# Patient Record
Sex: FEMALE | Race: WHITE
[De-identification: names, ages, dates, MRNs, and addresses within clinical notes are randomized per-mention and may not be internally consistent; named-entity substitution may affect disease eponyms.]

---

## 2017-11-15 ENCOUNTER — APPOINTMENT (OUTPATIENT)
Dept: BARIATRICS | Facility: CLINIC | Age: 62
End: 2017-11-15

## 2018-04-11 ENCOUNTER — OUTPATIENT (OUTPATIENT)
Dept: OUTPATIENT SERVICES | Facility: HOSPITAL | Age: 63
LOS: 1 days | Discharge: HOME | End: 2018-04-11
Payer: MEDICARE

## 2018-04-11 VITALS
OXYGEN SATURATION: 99 % | DIASTOLIC BLOOD PRESSURE: 88 MMHG | RESPIRATION RATE: 14 BRPM | HEART RATE: 50 BPM | HEIGHT: 64 IN | TEMPERATURE: 98 F | SYSTOLIC BLOOD PRESSURE: 140 MMHG | WEIGHT: 257.06 LBS

## 2018-04-11 DIAGNOSIS — Z01.818 ENCOUNTER FOR OTHER PREPROCEDURAL EXAMINATION: ICD-10-CM

## 2018-04-11 DIAGNOSIS — Z98.89 OTHER SPECIFIED POSTPROCEDURAL STATES: Chronic | ICD-10-CM

## 2018-04-11 DIAGNOSIS — Z95.1 PRESENCE OF AORTOCORONARY BYPASS GRAFT: Chronic | ICD-10-CM

## 2018-04-11 DIAGNOSIS — D62 ACUTE POSTHEMORRHAGIC ANEMIA: ICD-10-CM

## 2018-04-11 DIAGNOSIS — J98.4 OTHER DISORDERS OF LUNG: ICD-10-CM

## 2018-04-11 DIAGNOSIS — M17.11 UNILATERAL PRIMARY OSTEOARTHRITIS, RIGHT KNEE: ICD-10-CM

## 2018-04-11 DIAGNOSIS — E66.01 MORBID (SEVERE) OBESITY DUE TO EXCESS CALORIES: ICD-10-CM

## 2018-04-11 DIAGNOSIS — I10 ESSENTIAL (PRIMARY) HYPERTENSION: ICD-10-CM

## 2018-04-11 DIAGNOSIS — Z98.890 OTHER SPECIFIED POSTPROCEDURAL STATES: Chronic | ICD-10-CM

## 2018-04-11 DIAGNOSIS — Z98.84 BARIATRIC SURGERY STATUS: Chronic | ICD-10-CM

## 2018-04-11 DIAGNOSIS — Z96.612 PRESENCE OF LEFT ARTIFICIAL SHOULDER JOINT: Chronic | ICD-10-CM

## 2018-04-11 DIAGNOSIS — I25.2 OLD MYOCARDIAL INFARCTION: ICD-10-CM

## 2018-04-11 DIAGNOSIS — Z90.49 ACQUIRED ABSENCE OF OTHER SPECIFIED PARTS OF DIGESTIVE TRACT: Chronic | ICD-10-CM

## 2018-04-11 LAB
ANION GAP SERPL CALC-SCNC: 5 MMOL/L — SIGNIFICANT CHANGE UP (ref 5–17)
BASOPHILS # BLD AUTO: 0.05 K/UL — SIGNIFICANT CHANGE UP (ref 0–0.2)
BASOPHILS NFR BLD AUTO: 0.4 % — SIGNIFICANT CHANGE UP (ref 0–2)
BUN SERPL-MCNC: 27 MG/DL — HIGH (ref 7–23)
CALCIUM SERPL-MCNC: 9.3 MG/DL — SIGNIFICANT CHANGE UP (ref 8.5–10.1)
CHLORIDE SERPL-SCNC: 105 MMOL/L — SIGNIFICANT CHANGE UP (ref 96–108)
CO2 SERPL-SCNC: 30 MMOL/L — SIGNIFICANT CHANGE UP (ref 22–31)
CREAT SERPL-MCNC: 1.28 MG/DL — SIGNIFICANT CHANGE UP (ref 0.5–1.3)
EOSINOPHIL # BLD AUTO: 0.04 K/UL — SIGNIFICANT CHANGE UP (ref 0–0.5)
EOSINOPHIL NFR BLD AUTO: 0.3 % — SIGNIFICANT CHANGE UP (ref 0–6)
GLUCOSE SERPL-MCNC: 103 MG/DL — HIGH (ref 70–99)
HCT VFR BLD CALC: 43.8 % — SIGNIFICANT CHANGE UP (ref 34.5–45)
HGB BLD-MCNC: 13.8 G/DL — SIGNIFICANT CHANGE UP (ref 11.5–15.5)
IMM GRANULOCYTES NFR BLD AUTO: 0.5 % — SIGNIFICANT CHANGE UP (ref 0–1.5)
LYMPHOCYTES # BLD AUTO: 2.69 K/UL — SIGNIFICANT CHANGE UP (ref 1–3.3)
LYMPHOCYTES # BLD AUTO: 20.8 % — SIGNIFICANT CHANGE UP (ref 13–44)
MCHC RBC-ENTMCNC: 30.9 PG — SIGNIFICANT CHANGE UP (ref 27–34)
MCHC RBC-ENTMCNC: 31.5 GM/DL — LOW (ref 32–36)
MCV RBC AUTO: 98.2 FL — SIGNIFICANT CHANGE UP (ref 80–100)
MONOCYTES # BLD AUTO: 0.74 K/UL — SIGNIFICANT CHANGE UP (ref 0–0.9)
MONOCYTES NFR BLD AUTO: 5.7 % — SIGNIFICANT CHANGE UP (ref 2–14)
NEUTROPHILS # BLD AUTO: 9.35 K/UL — HIGH (ref 1.8–7.4)
NEUTROPHILS NFR BLD AUTO: 72.3 % — SIGNIFICANT CHANGE UP (ref 43–77)
PLATELET # BLD AUTO: 274 K/UL — SIGNIFICANT CHANGE UP (ref 150–400)
POTASSIUM SERPL-MCNC: 4.9 MMOL/L — SIGNIFICANT CHANGE UP (ref 3.5–5.3)
POTASSIUM SERPL-SCNC: 4.9 MMOL/L — SIGNIFICANT CHANGE UP (ref 3.5–5.3)
RBC # BLD: 4.46 M/UL — SIGNIFICANT CHANGE UP (ref 3.8–5.2)
RBC # FLD: 13 % — SIGNIFICANT CHANGE UP (ref 10.3–14.5)
SODIUM SERPL-SCNC: 140 MMOL/L — SIGNIFICANT CHANGE UP (ref 135–145)
TYPE + AB SCN PNL BLD: SIGNIFICANT CHANGE UP
WBC # BLD: 12.94 K/UL — HIGH (ref 3.8–10.5)
WBC # FLD AUTO: 12.94 K/UL — HIGH (ref 3.8–10.5)

## 2018-04-11 PROCEDURE — 71046 X-RAY EXAM CHEST 2 VIEWS: CPT | Mod: 26

## 2018-04-11 PROCEDURE — 93010 ELECTROCARDIOGRAM REPORT: CPT

## 2018-04-11 NOTE — H&P PST ADULT - HISTORY OF PRESENT ILLNESS
61 yo female presents to PST. c/o right knee pain x 4 years. States pain increases with walking & stairs. Reports "it locks up and I fall". reports knee pain 7/10 & is Followed by pain management for spinal stenosis.

## 2018-04-11 NOTE — H&P PST ADULT - FAMILY HISTORY
Father  Still living? No  Family history of heart disease, Age at diagnosis: Age Unknown     Mother  Still living? No  Family history of heart disease, Age at diagnosis: Age Unknown  Family history of stroke, Age at diagnosis: Age Unknown

## 2018-04-11 NOTE — H&P PST ADULT - PSH
History of esophagogastroduodenoscopy (EGD)    S/P   2980, 1982  S/P CABG x 2  2009  S/P cholecystectomy  1983  S/P colonoscopy    S/P laparoscopic sleeve gastrectomy  2016  S/P shoulder replacement, left  2010  Status post lung surgery  age 6 left upper lobectomy

## 2018-04-11 NOTE — H&P PST ADULT - MUSCULOSKELETAL COMMENTS
Reports right knee pain & limited ROM. Followed by pain management for h/o spinal stenosis. right anterior knee joints  to palpation. Right knee decreased extension

## 2018-04-11 NOTE — H&P PST ADULT - ASSESSMENT
yo scheduled for   with    on     1. Labs as per surgeon  2. EKG  3. Medical clearance with  4. discussed EZ sponges & day of procedure instructions 63 yo female scheduled for right TKR on 4/19/18 with Dr. Arredondo    1. Labs as per surgeon  2. EKG  3. Medical clearance with PCP Dr BRODY Ingram  4. discussed EZ sponges, mupirocin & day of procedure instructions  5. CXR  6. Stat PTT, PT/INR on admit  Caprini score 10

## 2018-04-11 NOTE — H&P PST ADULT - PMH
Asthma    Back pain    Hyperlipidemia    Hypertension    Insomnia    Knee pain, right    Morbid obesity    Myocardial infarction  2009  Osteoarthritis    Pain management    Sleep apnea  resolved with gastric sleeve surgery  Spinal stenosis Asthma    Back pain    Hyperlipidemia    Hypertension    Insomnia    Knee pain, right    Morbid obesity    Myocardial infarction  2009  Osteoarthritis    Pain management    Right bundle branch block    Sleep apnea  resolved with gastric sleeve surgery  Spinal stenosis

## 2018-04-12 LAB
MRSA PCR RESULT.: SIGNIFICANT CHANGE UP
S AUREUS DNA NOSE QL NAA+PROBE: SIGNIFICANT CHANGE UP

## 2018-04-13 RX ORDER — PANTOPRAZOLE SODIUM 20 MG/1
40 TABLET, DELAYED RELEASE ORAL ONCE
Qty: 0 | Refills: 0 | Status: COMPLETED | OUTPATIENT
Start: 2018-04-19 | End: 2018-04-19

## 2018-04-18 RX ORDER — CELECOXIB 200 MG/1
200 CAPSULE ORAL
Qty: 0 | Refills: 0 | Status: DISCONTINUED | OUTPATIENT
Start: 2018-04-19 | End: 2018-04-21

## 2018-04-18 RX ORDER — OXYCODONE HYDROCHLORIDE 5 MG/1
20 TABLET ORAL ONCE
Qty: 0 | Refills: 0 | Status: DISCONTINUED | OUTPATIENT
Start: 2018-04-19 | End: 2018-04-19

## 2018-04-18 RX ORDER — HYDROMORPHONE HYDROCHLORIDE 2 MG/ML
0.5 INJECTION INTRAMUSCULAR; INTRAVENOUS; SUBCUTANEOUS
Qty: 0 | Refills: 0 | Status: DISCONTINUED | OUTPATIENT
Start: 2018-04-19 | End: 2018-04-20

## 2018-04-18 RX ORDER — OXYCODONE HYDROCHLORIDE 5 MG/1
10 TABLET ORAL EVERY 12 HOURS
Qty: 0 | Refills: 0 | Status: DISCONTINUED | OUTPATIENT
Start: 2018-04-19 | End: 2018-04-21

## 2018-04-18 RX ORDER — ACETAMINOPHEN 500 MG
650 TABLET ORAL EVERY 6 HOURS
Qty: 0 | Refills: 0 | Status: DISCONTINUED | OUTPATIENT
Start: 2018-04-19 | End: 2018-04-21

## 2018-04-18 RX ORDER — SODIUM CHLORIDE 9 MG/ML
3 INJECTION INTRAMUSCULAR; INTRAVENOUS; SUBCUTANEOUS EVERY 8 HOURS
Qty: 0 | Refills: 0 | Status: DISCONTINUED | OUTPATIENT
Start: 2018-04-19 | End: 2018-04-21

## 2018-04-18 RX ORDER — DOCUSATE SODIUM 100 MG
100 CAPSULE ORAL EVERY 12 HOURS
Qty: 0 | Refills: 0 | Status: DISCONTINUED | OUTPATIENT
Start: 2018-04-19 | End: 2018-04-21

## 2018-04-18 RX ORDER — ACETAMINOPHEN 500 MG
650 TABLET ORAL ONCE
Qty: 0 | Refills: 0 | Status: COMPLETED | OUTPATIENT
Start: 2018-04-19 | End: 2018-04-19

## 2018-04-18 RX ORDER — OXYCODONE HYDROCHLORIDE 5 MG/1
5 TABLET ORAL EVERY 4 HOURS
Qty: 0 | Refills: 0 | Status: DISCONTINUED | OUTPATIENT
Start: 2018-04-19 | End: 2018-04-20

## 2018-04-18 RX ORDER — OXYCODONE HYDROCHLORIDE 5 MG/1
10 TABLET ORAL EVERY 4 HOURS
Qty: 0 | Refills: 0 | Status: DISCONTINUED | OUTPATIENT
Start: 2018-04-19 | End: 2018-04-20

## 2018-04-18 RX ORDER — ONDANSETRON 8 MG/1
4 TABLET, FILM COATED ORAL EVERY 6 HOURS
Qty: 0 | Refills: 0 | Status: DISCONTINUED | OUTPATIENT
Start: 2018-04-19 | End: 2018-04-21

## 2018-04-18 RX ORDER — FAMOTIDINE 10 MG/ML
20 INJECTION INTRAVENOUS ONCE
Qty: 0 | Refills: 0 | Status: COMPLETED | OUTPATIENT
Start: 2018-04-19 | End: 2018-04-19

## 2018-04-19 ENCOUNTER — RESULT REVIEW (OUTPATIENT)
Age: 63
End: 2018-04-19

## 2018-04-19 ENCOUNTER — INPATIENT (INPATIENT)
Facility: HOSPITAL | Age: 63
LOS: 1 days | Discharge: NURSING, REHAB OR LONG-TERM CARE | End: 2018-04-21
Attending: ORTHOPAEDIC SURGERY | Admitting: ORTHOPAEDIC SURGERY
Payer: COMMERCIAL

## 2018-04-19 VITALS
TEMPERATURE: 98 F | OXYGEN SATURATION: 100 % | DIASTOLIC BLOOD PRESSURE: 83 MMHG | WEIGHT: 257.06 LBS | SYSTOLIC BLOOD PRESSURE: 151 MMHG | HEIGHT: 64 IN | RESPIRATION RATE: 16 BRPM | HEART RATE: 49 BPM

## 2018-04-19 DIAGNOSIS — Z98.890 OTHER SPECIFIED POSTPROCEDURAL STATES: Chronic | ICD-10-CM

## 2018-04-19 DIAGNOSIS — Z96.612 PRESENCE OF LEFT ARTIFICIAL SHOULDER JOINT: Chronic | ICD-10-CM

## 2018-04-19 DIAGNOSIS — Z95.1 PRESENCE OF AORTOCORONARY BYPASS GRAFT: Chronic | ICD-10-CM

## 2018-04-19 DIAGNOSIS — Z98.89 OTHER SPECIFIED POSTPROCEDURAL STATES: Chronic | ICD-10-CM

## 2018-04-19 DIAGNOSIS — Z90.49 ACQUIRED ABSENCE OF OTHER SPECIFIED PARTS OF DIGESTIVE TRACT: Chronic | ICD-10-CM

## 2018-04-19 DIAGNOSIS — Z98.84 BARIATRIC SURGERY STATUS: Chronic | ICD-10-CM

## 2018-04-19 LAB
ANION GAP SERPL CALC-SCNC: 5 MMOL/L — SIGNIFICANT CHANGE UP (ref 5–17)
APTT BLD: 27 SEC — LOW (ref 27.5–37.4)
BUN SERPL-MCNC: 11 MG/DL — SIGNIFICANT CHANGE UP (ref 7–23)
CALCIUM SERPL-MCNC: 8.8 MG/DL — SIGNIFICANT CHANGE UP (ref 8.5–10.1)
CHLORIDE SERPL-SCNC: 109 MMOL/L — HIGH (ref 96–108)
CO2 SERPL-SCNC: 29 MMOL/L — SIGNIFICANT CHANGE UP (ref 22–31)
CREAT SERPL-MCNC: 1.19 MG/DL — SIGNIFICANT CHANGE UP (ref 0.5–1.3)
GLUCOSE SERPL-MCNC: 88 MG/DL — SIGNIFICANT CHANGE UP (ref 70–99)
HCT VFR BLD CALC: 36.2 % — SIGNIFICANT CHANGE UP (ref 34.5–45)
HGB BLD-MCNC: 11.4 G/DL — LOW (ref 11.5–15.5)
INR BLD: 0.98 RATIO — SIGNIFICANT CHANGE UP (ref 0.88–1.16)
MCHC RBC-ENTMCNC: 31.3 PG — SIGNIFICANT CHANGE UP (ref 27–34)
MCHC RBC-ENTMCNC: 31.5 GM/DL — LOW (ref 32–36)
MCV RBC AUTO: 99.5 FL — SIGNIFICANT CHANGE UP (ref 80–100)
NRBC # BLD: 0 /100 WBCS — SIGNIFICANT CHANGE UP (ref 0–0)
PLATELET # BLD AUTO: 177 K/UL — SIGNIFICANT CHANGE UP (ref 150–400)
POTASSIUM SERPL-MCNC: 4.3 MMOL/L — SIGNIFICANT CHANGE UP (ref 3.5–5.3)
POTASSIUM SERPL-SCNC: 4.3 MMOL/L — SIGNIFICANT CHANGE UP (ref 3.5–5.3)
PROTHROM AB SERPL-ACNC: 10.6 SEC — SIGNIFICANT CHANGE UP (ref 9.8–12.7)
RBC # BLD: 3.64 M/UL — LOW (ref 3.8–5.2)
RBC # FLD: 13.2 % — SIGNIFICANT CHANGE UP (ref 10.3–14.5)
SODIUM SERPL-SCNC: 143 MMOL/L — SIGNIFICANT CHANGE UP (ref 135–145)
WBC # BLD: 6.73 K/UL — SIGNIFICANT CHANGE UP (ref 3.8–10.5)
WBC # FLD AUTO: 6.73 K/UL — SIGNIFICANT CHANGE UP (ref 3.8–10.5)

## 2018-04-19 PROCEDURE — 88305 TISSUE EXAM BY PATHOLOGIST: CPT | Mod: 26

## 2018-04-19 PROCEDURE — 73560 X-RAY EXAM OF KNEE 1 OR 2: CPT | Mod: 26,RT

## 2018-04-19 RX ORDER — SODIUM CHLORIDE 9 MG/ML
1000 INJECTION, SOLUTION INTRAVENOUS
Qty: 0 | Refills: 0 | Status: DISCONTINUED | OUTPATIENT
Start: 2018-04-19 | End: 2018-04-21

## 2018-04-19 RX ORDER — TEMAZEPAM 15 MG/1
30 CAPSULE ORAL AT BEDTIME
Qty: 0 | Refills: 0 | Status: DISCONTINUED | OUTPATIENT
Start: 2018-04-19 | End: 2018-04-21

## 2018-04-19 RX ORDER — FOLIC ACID 0.8 MG
1 TABLET ORAL DAILY
Qty: 0 | Refills: 0 | Status: DISCONTINUED | OUTPATIENT
Start: 2018-04-19 | End: 2018-04-21

## 2018-04-19 RX ORDER — ATORVASTATIN CALCIUM 80 MG/1
20 TABLET, FILM COATED ORAL AT BEDTIME
Qty: 0 | Refills: 0 | Status: DISCONTINUED | OUTPATIENT
Start: 2018-04-19 | End: 2018-04-21

## 2018-04-19 RX ORDER — GABAPENTIN 400 MG/1
300 CAPSULE ORAL THREE TIMES A DAY
Qty: 0 | Refills: 0 | Status: DISCONTINUED | OUTPATIENT
Start: 2018-04-19 | End: 2018-04-21

## 2018-04-19 RX ORDER — TRAZODONE HCL 50 MG
100 TABLET ORAL AT BEDTIME
Qty: 0 | Refills: 0 | Status: DISCONTINUED | OUTPATIENT
Start: 2018-04-19 | End: 2018-04-21

## 2018-04-19 RX ORDER — ONDANSETRON 8 MG/1
4 TABLET, FILM COATED ORAL EVERY 4 HOURS
Qty: 0 | Refills: 0 | Status: DISCONTINUED | OUTPATIENT
Start: 2018-04-19 | End: 2018-04-19

## 2018-04-19 RX ORDER — ASCORBIC ACID 60 MG
500 TABLET,CHEWABLE ORAL
Qty: 0 | Refills: 0 | Status: DISCONTINUED | OUTPATIENT
Start: 2018-04-19 | End: 2018-04-21

## 2018-04-19 RX ORDER — METOPROLOL TARTRATE 50 MG
50 TABLET ORAL
Qty: 0 | Refills: 0 | Status: DISCONTINUED | OUTPATIENT
Start: 2018-04-19 | End: 2018-04-21

## 2018-04-19 RX ORDER — FENTANYL CITRATE 50 UG/ML
50 INJECTION INTRAVENOUS
Qty: 0 | Refills: 0 | Status: DISCONTINUED | OUTPATIENT
Start: 2018-04-19 | End: 2018-04-19

## 2018-04-19 RX ORDER — ASPIRIN/CALCIUM CARB/MAGNESIUM 324 MG
325 TABLET ORAL
Qty: 0 | Refills: 0 | Status: DISCONTINUED | OUTPATIENT
Start: 2018-04-20 | End: 2018-04-20

## 2018-04-19 RX ORDER — PANTOPRAZOLE SODIUM 20 MG/1
40 TABLET, DELAYED RELEASE ORAL DAILY
Qty: 0 | Refills: 0 | Status: DISCONTINUED | OUTPATIENT
Start: 2018-04-19 | End: 2018-04-21

## 2018-04-19 RX ORDER — ACETAMINOPHEN 500 MG
650 TABLET ORAL EVERY 6 HOURS
Qty: 0 | Refills: 0 | Status: DISCONTINUED | OUTPATIENT
Start: 2018-04-19 | End: 2018-04-21

## 2018-04-19 RX ORDER — ONDANSETRON 8 MG/1
4 TABLET, FILM COATED ORAL EVERY 6 HOURS
Qty: 0 | Refills: 0 | Status: DISCONTINUED | OUTPATIENT
Start: 2018-04-19 | End: 2018-04-21

## 2018-04-19 RX ORDER — FENOFIBRATE,MICRONIZED 130 MG
48 CAPSULE ORAL DAILY
Qty: 0 | Refills: 0 | Status: DISCONTINUED | OUTPATIENT
Start: 2018-04-19 | End: 2018-04-21

## 2018-04-19 RX ORDER — BENZOCAINE AND MENTHOL 5; 1 G/100ML; G/100ML
1 LIQUID ORAL
Qty: 0 | Refills: 0 | Status: DISCONTINUED | OUTPATIENT
Start: 2018-04-19 | End: 2018-04-21

## 2018-04-19 RX ORDER — CELECOXIB 200 MG/1
200 CAPSULE ORAL ONCE
Qty: 0 | Refills: 0 | Status: COMPLETED | OUTPATIENT
Start: 2018-04-19 | End: 2018-04-19

## 2018-04-19 RX ORDER — MAGNESIUM HYDROXIDE 400 MG/1
30 TABLET, CHEWABLE ORAL DAILY
Qty: 0 | Refills: 0 | Status: DISCONTINUED | OUTPATIENT
Start: 2018-04-19 | End: 2018-04-21

## 2018-04-19 RX ORDER — DIPHENHYDRAMINE HCL 50 MG
25 CAPSULE ORAL AT BEDTIME
Qty: 0 | Refills: 0 | Status: DISCONTINUED | OUTPATIENT
Start: 2018-04-19 | End: 2018-04-21

## 2018-04-19 RX ORDER — DIPHENHYDRAMINE HCL 50 MG
25 CAPSULE ORAL EVERY 4 HOURS
Qty: 0 | Refills: 0 | Status: DISCONTINUED | OUTPATIENT
Start: 2018-04-19 | End: 2018-04-21

## 2018-04-19 RX ORDER — SENNA PLUS 8.6 MG/1
2 TABLET ORAL AT BEDTIME
Qty: 0 | Refills: 0 | Status: DISCONTINUED | OUTPATIENT
Start: 2018-04-19 | End: 2018-04-21

## 2018-04-19 RX ORDER — SODIUM CHLORIDE 9 MG/ML
1000 INJECTION, SOLUTION INTRAVENOUS
Qty: 0 | Refills: 0 | Status: DISCONTINUED | OUTPATIENT
Start: 2018-04-19 | End: 2018-04-19

## 2018-04-19 RX ORDER — CEFAZOLIN SODIUM 1 G
2000 VIAL (EA) INJECTION EVERY 6 HOURS
Qty: 0 | Refills: 0 | Status: COMPLETED | OUTPATIENT
Start: 2018-04-19 | End: 2018-04-20

## 2018-04-19 RX ORDER — FERROUS SULFATE 325(65) MG
325 TABLET ORAL
Qty: 0 | Refills: 0 | Status: DISCONTINUED | OUTPATIENT
Start: 2018-04-19 | End: 2018-04-21

## 2018-04-19 RX ORDER — POLYETHYLENE GLYCOL 3350 17 G/17G
17 POWDER, FOR SOLUTION ORAL DAILY
Qty: 0 | Refills: 0 | Status: DISCONTINUED | OUTPATIENT
Start: 2018-04-19 | End: 2018-04-21

## 2018-04-19 RX ORDER — OXYCODONE HYDROCHLORIDE 5 MG/1
5 TABLET ORAL EVERY 4 HOURS
Qty: 0 | Refills: 0 | Status: DISCONTINUED | OUTPATIENT
Start: 2018-04-19 | End: 2018-04-19

## 2018-04-19 RX ADMIN — Medication 650 MILLIGRAM(S): at 23:04

## 2018-04-19 RX ADMIN — HYDROMORPHONE HYDROCHLORIDE 0.5 MILLIGRAM(S): 2 INJECTION INTRAMUSCULAR; INTRAVENOUS; SUBCUTANEOUS at 21:47

## 2018-04-19 RX ADMIN — OXYCODONE HYDROCHLORIDE 10 MILLIGRAM(S): 5 TABLET ORAL at 18:09

## 2018-04-19 RX ADMIN — GABAPENTIN 300 MILLIGRAM(S): 400 CAPSULE ORAL at 21:47

## 2018-04-19 RX ADMIN — CELECOXIB 200 MILLIGRAM(S): 200 CAPSULE ORAL at 10:37

## 2018-04-19 RX ADMIN — FAMOTIDINE 20 MILLIGRAM(S): 10 INJECTION INTRAVENOUS at 10:38

## 2018-04-19 RX ADMIN — OXYCODONE HYDROCHLORIDE 10 MILLIGRAM(S): 5 TABLET ORAL at 22:40

## 2018-04-19 RX ADMIN — CELECOXIB 200 MILLIGRAM(S): 200 CAPSULE ORAL at 10:38

## 2018-04-19 RX ADMIN — Medication 650 MILLIGRAM(S): at 10:37

## 2018-04-19 RX ADMIN — SODIUM CHLORIDE 75 MILLILITER(S): 9 INJECTION, SOLUTION INTRAVENOUS at 17:09

## 2018-04-19 RX ADMIN — SODIUM CHLORIDE 3 MILLILITER(S): 9 INJECTION INTRAMUSCULAR; INTRAVENOUS; SUBCUTANEOUS at 21:34

## 2018-04-19 RX ADMIN — PANTOPRAZOLE SODIUM 40 MILLIGRAM(S): 20 TABLET, DELAYED RELEASE ORAL at 10:37

## 2018-04-19 RX ADMIN — HYDROMORPHONE HYDROCHLORIDE 0.5 MILLIGRAM(S): 2 INJECTION INTRAMUSCULAR; INTRAVENOUS; SUBCUTANEOUS at 22:00

## 2018-04-19 RX ADMIN — ATORVASTATIN CALCIUM 20 MILLIGRAM(S): 80 TABLET, FILM COATED ORAL at 21:47

## 2018-04-19 RX ADMIN — OXYCODONE HYDROCHLORIDE 10 MILLIGRAM(S): 5 TABLET ORAL at 21:47

## 2018-04-19 RX ADMIN — OXYCODONE HYDROCHLORIDE 20 MILLIGRAM(S): 5 TABLET ORAL at 10:37

## 2018-04-19 RX ADMIN — Medication 100 MILLIGRAM(S): at 23:04

## 2018-04-19 RX ADMIN — OXYCODONE HYDROCHLORIDE 20 MILLIGRAM(S): 5 TABLET ORAL at 10:38

## 2018-04-19 RX ADMIN — Medication 100 MILLIGRAM(S): at 18:15

## 2018-04-19 RX ADMIN — SODIUM CHLORIDE 75 MILLILITER(S): 9 INJECTION, SOLUTION INTRAVENOUS at 23:14

## 2018-04-19 RX ADMIN — Medication 650 MILLIGRAM(S): at 10:38

## 2018-04-19 NOTE — PROGRESS NOTE ADULT - ASSESSMENT
62F s/p Right TKA     P:  WBAT RLE   therapy   DVT ppx   post op abx   venodynes  incentive spirometer  follow labs   pain control   no pillow under knee

## 2018-04-19 NOTE — PHYSICAL THERAPY INITIAL EVALUATION ADULT - MODALITIES TREATMENT COMMENTS
The pt responded well to vc for sequencing of gait and proper RW usage. The pt demonstrated good therex tolerance and transfer/ ambulation ability but was limited by intermittent c/o aki-incisional pain/ burning, RN aware. Pt medicated prior to ambulation assessment. The pt completed PT evaluation and then requested to ambulate to the bathroom. The pt was left OOB and in the bathroom/ commode chair, nursing assistant tending to the patient at end of tx. Call bell and IV in place.

## 2018-04-19 NOTE — PHYSICAL THERAPY INITIAL EVALUATION ADULT - PERTINENT HX OF CURRENT PROBLEM, REHAB EVAL
63 yo female with 4 year hx of right knee pain s/p mechanical slip and fall on black ice 4 years ago. States pain increases with walking & stairs. The pt follows pain management for spinal stenosis.

## 2018-04-19 NOTE — PATIENT PROFILE ADULT. - PMH
Asthma    Back pain    Hyperlipidemia    Hypertension    Insomnia    Knee pain, right    Morbid obesity    Myocardial infarction  2009  Osteoarthritis    Pain management    Right bundle branch block    Sleep apnea  resolved with gastric sleeve surgery  Spinal stenosis

## 2018-04-19 NOTE — BRIEF OPERATIVE NOTE - PROCEDURE
<<-----Click on this checkbox to enter Procedure Right total knee arthroplasty  04/19/2018    Active  KFRISCH

## 2018-04-19 NOTE — PHYSICAL THERAPY INITIAL EVALUATION ADULT - GENERAL OBSERVATIONS, REHAB EVAL
The pt was pleasant and cooperative, received on 2N, supine and eager to participate in PT evaluation. Right LE Knee immobilizer in place, 1 IV, and bilateral SCDs in place.

## 2018-04-19 NOTE — PHYSICAL THERAPY INITIAL EVALUATION ADULT - ADDITIONAL COMMENTS
Pt reports being independent prior to surgery but was limited by pain with movement over the right knee.

## 2018-04-20 ENCOUNTER — TRANSCRIPTION ENCOUNTER (OUTPATIENT)
Age: 63
End: 2018-04-20

## 2018-04-20 LAB
ANION GAP SERPL CALC-SCNC: 5 MMOL/L — SIGNIFICANT CHANGE UP (ref 5–17)
BUN SERPL-MCNC: 13 MG/DL — SIGNIFICANT CHANGE UP (ref 7–23)
CALCIUM SERPL-MCNC: 8.3 MG/DL — LOW (ref 8.5–10.1)
CHLORIDE SERPL-SCNC: 109 MMOL/L — HIGH (ref 96–108)
CO2 SERPL-SCNC: 28 MMOL/L — SIGNIFICANT CHANGE UP (ref 22–31)
CREAT SERPL-MCNC: 1.37 MG/DL — HIGH (ref 0.5–1.3)
GLUCOSE SERPL-MCNC: 97 MG/DL — SIGNIFICANT CHANGE UP (ref 70–99)
HCT VFR BLD CALC: 34.2 % — LOW (ref 34.5–45)
HGB BLD-MCNC: 11 G/DL — LOW (ref 11.5–15.5)
INR BLD: 1.1 RATIO — SIGNIFICANT CHANGE UP (ref 0.88–1.16)
MCHC RBC-ENTMCNC: 31.4 PG — SIGNIFICANT CHANGE UP (ref 27–34)
MCHC RBC-ENTMCNC: 32.2 GM/DL — SIGNIFICANT CHANGE UP (ref 32–36)
MCV RBC AUTO: 97.7 FL — SIGNIFICANT CHANGE UP (ref 80–100)
NRBC # BLD: 0 /100 WBCS — SIGNIFICANT CHANGE UP (ref 0–0)
PLATELET # BLD AUTO: 167 K/UL — SIGNIFICANT CHANGE UP (ref 150–400)
POTASSIUM SERPL-MCNC: 4.2 MMOL/L — SIGNIFICANT CHANGE UP (ref 3.5–5.3)
POTASSIUM SERPL-SCNC: 4.2 MMOL/L — SIGNIFICANT CHANGE UP (ref 3.5–5.3)
PROTHROM AB SERPL-ACNC: 11.9 SEC — SIGNIFICANT CHANGE UP (ref 9.8–12.7)
RBC # BLD: 3.5 M/UL — LOW (ref 3.8–5.2)
RBC # FLD: 13.2 % — SIGNIFICANT CHANGE UP (ref 10.3–14.5)
SODIUM SERPL-SCNC: 142 MMOL/L — SIGNIFICANT CHANGE UP (ref 135–145)
WBC # BLD: 8.17 K/UL — SIGNIFICANT CHANGE UP (ref 3.8–10.5)
WBC # FLD AUTO: 8.17 K/UL — SIGNIFICANT CHANGE UP (ref 3.8–10.5)

## 2018-04-20 PROCEDURE — 99223 1ST HOSP IP/OBS HIGH 75: CPT

## 2018-04-20 RX ORDER — HYDROMORPHONE HYDROCHLORIDE 2 MG/ML
2 INJECTION INTRAMUSCULAR; INTRAVENOUS; SUBCUTANEOUS EVERY 4 HOURS
Qty: 0 | Refills: 0 | Status: DISCONTINUED | OUTPATIENT
Start: 2018-04-20 | End: 2018-04-21

## 2018-04-20 RX ORDER — PANTOPRAZOLE SODIUM 20 MG/1
1 TABLET, DELAYED RELEASE ORAL
Qty: 30 | Refills: 0
Start: 2018-04-20 | End: 2018-05-19

## 2018-04-20 RX ORDER — HYDROMORPHONE HYDROCHLORIDE 2 MG/ML
1 INJECTION INTRAMUSCULAR; INTRAVENOUS; SUBCUTANEOUS EVERY 4 HOURS
Qty: 0 | Refills: 0 | Status: DISCONTINUED | OUTPATIENT
Start: 2018-04-20 | End: 2018-04-21

## 2018-04-20 RX ORDER — ENOXAPARIN SODIUM 100 MG/ML
40 INJECTION SUBCUTANEOUS
Qty: 4 | Refills: 1
Start: 2018-04-20 | End: 2018-04-29

## 2018-04-20 RX ORDER — OXYMORPHONE HYDROCHLORIDE 10 MG/1
1 TABLET, EXTENDED RELEASE ORAL
Qty: 0 | Refills: 0 | COMMUNITY

## 2018-04-20 RX ORDER — HEPARIN SODIUM 5000 [USP'U]/ML
5000 INJECTION INTRAVENOUS; SUBCUTANEOUS EVERY 8 HOURS
Qty: 0 | Refills: 0 | Status: DISCONTINUED | OUTPATIENT
Start: 2018-04-20 | End: 2018-04-21

## 2018-04-20 RX ORDER — WARFARIN SODIUM 2.5 MG/1
5 TABLET ORAL DAILY
Qty: 0 | Refills: 0 | Status: DISCONTINUED | OUTPATIENT
Start: 2018-04-20 | End: 2018-04-21

## 2018-04-20 RX ORDER — HYDROMORPHONE HYDROCHLORIDE 2 MG/ML
4 INJECTION INTRAMUSCULAR; INTRAVENOUS; SUBCUTANEOUS EVERY 4 HOURS
Qty: 0 | Refills: 0 | Status: DISCONTINUED | OUTPATIENT
Start: 2018-04-20 | End: 2018-04-21

## 2018-04-20 RX ORDER — WARFARIN SODIUM 2.5 MG/1
1 TABLET ORAL
Qty: 30 | Refills: 1
Start: 2018-04-20 | End: 2018-06-18

## 2018-04-20 RX ORDER — DOCUSATE SODIUM 100 MG
1 CAPSULE ORAL
Qty: 14 | Refills: 0
Start: 2018-04-20 | End: 2018-04-26

## 2018-04-20 RX ADMIN — GABAPENTIN 300 MILLIGRAM(S): 400 CAPSULE ORAL at 05:34

## 2018-04-20 RX ADMIN — Medication 50 MILLIGRAM(S): at 05:34

## 2018-04-20 RX ADMIN — Medication 325 MILLIGRAM(S): at 05:33

## 2018-04-20 RX ADMIN — HYDROMORPHONE HYDROCHLORIDE 4 MILLIGRAM(S): 2 INJECTION INTRAMUSCULAR; INTRAVENOUS; SUBCUTANEOUS at 21:21

## 2018-04-20 RX ADMIN — HYDROMORPHONE HYDROCHLORIDE 1 MILLIGRAM(S): 2 INJECTION INTRAMUSCULAR; INTRAVENOUS; SUBCUTANEOUS at 16:20

## 2018-04-20 RX ADMIN — SODIUM CHLORIDE 3 MILLILITER(S): 9 INJECTION INTRAMUSCULAR; INTRAVENOUS; SUBCUTANEOUS at 05:31

## 2018-04-20 RX ADMIN — Medication 325 MILLIGRAM(S): at 08:17

## 2018-04-20 RX ADMIN — Medication 1 MILLIGRAM(S): at 12:07

## 2018-04-20 RX ADMIN — HYDROMORPHONE HYDROCHLORIDE 0.5 MILLIGRAM(S): 2 INJECTION INTRAMUSCULAR; INTRAVENOUS; SUBCUTANEOUS at 03:15

## 2018-04-20 RX ADMIN — HEPARIN SODIUM 5000 UNIT(S): 5000 INJECTION INTRAVENOUS; SUBCUTANEOUS at 15:48

## 2018-04-20 RX ADMIN — PANTOPRAZOLE SODIUM 40 MILLIGRAM(S): 20 TABLET, DELAYED RELEASE ORAL at 12:08

## 2018-04-20 RX ADMIN — OXYCODONE HYDROCHLORIDE 10 MILLIGRAM(S): 5 TABLET ORAL at 10:04

## 2018-04-20 RX ADMIN — Medication 650 MILLIGRAM(S): at 12:07

## 2018-04-20 RX ADMIN — Medication 325 MILLIGRAM(S): at 18:56

## 2018-04-20 RX ADMIN — HYDROMORPHONE HYDROCHLORIDE 0.5 MILLIGRAM(S): 2 INJECTION INTRAMUSCULAR; INTRAVENOUS; SUBCUTANEOUS at 08:55

## 2018-04-20 RX ADMIN — Medication 100 MILLIGRAM(S): at 05:34

## 2018-04-20 RX ADMIN — SODIUM CHLORIDE 3 MILLILITER(S): 9 INJECTION INTRAMUSCULAR; INTRAVENOUS; SUBCUTANEOUS at 21:12

## 2018-04-20 RX ADMIN — Medication 100 MILLIGRAM(S): at 18:56

## 2018-04-20 RX ADMIN — Medication 100 MILLIGRAM(S): at 00:29

## 2018-04-20 RX ADMIN — OXYCODONE HYDROCHLORIDE 10 MILLIGRAM(S): 5 TABLET ORAL at 06:40

## 2018-04-20 RX ADMIN — HYDROMORPHONE HYDROCHLORIDE 4 MILLIGRAM(S): 2 INJECTION INTRAMUSCULAR; INTRAVENOUS; SUBCUTANEOUS at 22:00

## 2018-04-20 RX ADMIN — HEPARIN SODIUM 5000 UNIT(S): 5000 INJECTION INTRAVENOUS; SUBCUTANEOUS at 08:17

## 2018-04-20 RX ADMIN — GABAPENTIN 300 MILLIGRAM(S): 400 CAPSULE ORAL at 15:39

## 2018-04-20 RX ADMIN — ATORVASTATIN CALCIUM 20 MILLIGRAM(S): 80 TABLET, FILM COATED ORAL at 21:21

## 2018-04-20 RX ADMIN — TEMAZEPAM 30 MILLIGRAM(S): 15 CAPSULE ORAL at 21:21

## 2018-04-20 RX ADMIN — WARFARIN SODIUM 5 MILLIGRAM(S): 2.5 TABLET ORAL at 21:21

## 2018-04-20 RX ADMIN — Medication 100 MILLIGRAM(S): at 21:22

## 2018-04-20 RX ADMIN — Medication 1 TABLET(S): at 15:39

## 2018-04-20 RX ADMIN — CELECOXIB 200 MILLIGRAM(S): 200 CAPSULE ORAL at 08:17

## 2018-04-20 RX ADMIN — Medication 500 MILLIGRAM(S): at 18:56

## 2018-04-20 RX ADMIN — Medication 650 MILLIGRAM(S): at 18:56

## 2018-04-20 RX ADMIN — POLYETHYLENE GLYCOL 3350 17 GRAM(S): 17 POWDER, FOR SOLUTION ORAL at 12:09

## 2018-04-20 RX ADMIN — HYDROMORPHONE HYDROCHLORIDE 0.5 MILLIGRAM(S): 2 INJECTION INTRAMUSCULAR; INTRAVENOUS; SUBCUTANEOUS at 08:25

## 2018-04-20 RX ADMIN — Medication 650 MILLIGRAM(S): at 05:34

## 2018-04-20 RX ADMIN — OXYCODONE HYDROCHLORIDE 10 MILLIGRAM(S): 5 TABLET ORAL at 14:17

## 2018-04-20 RX ADMIN — HEPARIN SODIUM 5000 UNIT(S): 5000 INJECTION INTRAVENOUS; SUBCUTANEOUS at 21:21

## 2018-04-20 RX ADMIN — SODIUM CHLORIDE 3 MILLILITER(S): 9 INJECTION INTRAMUSCULAR; INTRAVENOUS; SUBCUTANEOUS at 15:51

## 2018-04-20 RX ADMIN — OXYCODONE HYDROCHLORIDE 10 MILLIGRAM(S): 5 TABLET ORAL at 13:47

## 2018-04-20 RX ADMIN — GABAPENTIN 300 MILLIGRAM(S): 400 CAPSULE ORAL at 21:21

## 2018-04-20 RX ADMIN — OXYCODONE HYDROCHLORIDE 10 MILLIGRAM(S): 5 TABLET ORAL at 05:35

## 2018-04-20 RX ADMIN — CELECOXIB 200 MILLIGRAM(S): 200 CAPSULE ORAL at 08:55

## 2018-04-20 RX ADMIN — Medication 325 MILLIGRAM(S): at 12:07

## 2018-04-20 RX ADMIN — HYDROMORPHONE HYDROCHLORIDE 1 MILLIGRAM(S): 2 INJECTION INTRAMUSCULAR; INTRAVENOUS; SUBCUTANEOUS at 15:49

## 2018-04-20 RX ADMIN — HYDROMORPHONE HYDROCHLORIDE 0.5 MILLIGRAM(S): 2 INJECTION INTRAMUSCULAR; INTRAVENOUS; SUBCUTANEOUS at 03:01

## 2018-04-20 RX ADMIN — Medication 1 TABLET(S): at 12:07

## 2018-04-20 RX ADMIN — Medication 48 MILLIGRAM(S): at 12:09

## 2018-04-20 NOTE — DISCHARGE NOTE ADULT - PLAN OF CARE
Improved pain, Improved Function, Return to ADLs Discharge Instructions for Total Knee Arthroplasty    1.  Diet: Resume previous diet  2. Activity: WBAT, Rolling walker, Daily PT. Gentle ROM 0-full as tolerated. Walk plenty.  Wear immobilizer only while asleep x 3 weeks.   3. Call with: fever over 101, wound redness, drainage or open area, calf pain/calf swelling  4. Wound Care: Remove old and place new Aquacel  bandage to Knee every 7 days. RN can Remove Sutures Post Op Day #14 (5/3/18) so long as wound is healed, no drainage or open area. OK to Shower with Aquacel; Must be and Aquacel bandage to shower.  Avoid direct water beating on bandage.  Continue ICE packs to knee.  5. DVT PE Prophylaxis: Managed by Anticoag Team. See Anticoagulation Instructions. See Med Rec.  6. Continue Protonix daily while on Anticoagulant. an eRx has been sent to your pharmacy.  7. Labs: Check H&H weekly while on Anticoagulation. Check INR while on Coumadin.  8. Follow Up: Dr. Arredondo 2 weeks in office. Call to schedule.  9. Pain medications:  If going home, eRX sent to your pharmacy for . Discharge Instructions for Right Total Knee Arthroplasty    1.  Diet: Resume previous diet  2. Activity: WBAT, Rolling walker, Daily PT. Gentle ROM 0-full as tolerated. Walk plenty.  Wear immobilizer only while asleep x 3 weeks.   3. Call with: fever over 101, wound redness, drainage or open area, calf pain/calf swelling  4. Wound Care: Remove old and place new Aquacel  bandage to Knee every 7 days. RN can Remove Sutures Post Op Day #14 (5/3/18) so long as wound is healed, no drainage or open area. OK to Shower with Aquacel; Must be and Aquacel bandage to shower.  Avoid direct water beating on bandage.  Continue ICE packs to knee.  5. DVT PE Prophylaxis: Managed by Anticoag Team. See Anticoagulation Instructions. See Med Rec.  6. Continue Protonix daily while on Anticoagulant. an eRx has been sent to your pharmacy.  7. Labs: Check H&H weekly while on Anticoagulation. Check INR while on Coumadin.  8. Follow Up: Dr. Arredondo 2 weeks in office. Call to schedule.  9. Pain medications:  See med rec.

## 2018-04-20 NOTE — PROGRESS NOTE ADULT - ASSESSMENT
A/P: 62F s/p R TKA POD 1  Pain Control  DVT ppx  WBAT  PT/OT  Incentive Spirometry  Medical management appreciated  DC planning

## 2018-04-20 NOTE — PROGRESS NOTE ADULT - ASSESSMENT
Pt is a 63 y/o obese female with h/o CAD s/p CABG, HTN, hyperlipidemia, insomnia, osteoarthritis who has been having increasing Rt knee pain due to osteoarthritis.  Her pain has been getting worse over the years, affecting her ADLs and quality of live therefore she was admitted for elective Rt total knee arthroplasty.  Post-op medicine consult called for comanagement.      * Osteoarthritis-s/p Rt TKA, continue pain control, PT, monitor post-op labs, DVT proph, encourage use of incentive spirometry.  * Acute Blood Loss Anemia-surgical loss, monitor, po iron.  * CAD-stable, continue metoprolol, atorvastatin  * Hyperlipidemia-continue atorvastatin, fenofibrate  * Insomnia-continue Trazadone, temazepam  * Proph- aspirin per ortho  * Disp-OOB, PT, d/c planning   * Comm- d/w pt, RN Pt is a 61 y/o obese female with h/o CAD s/p CABG, HTN, hyperlipidemia, insomnia, osteoarthritis who has been having increasing Rt knee pain due to osteoarthritis.  Her pain has been getting worse over the years, affecting her ADLs and quality of live therefore she was admitted for elective Rt total knee arthroplasty.  Post-op medicine consult called for comanagement.      * Osteoarthritis-s/p Rt TKA, continue pain control, PT, monitor post-op labs, DVT proph, encourage use of incentive spirometry.  * Acute Blood Loss Anemia-surgical loss, monitor, po iron.  * CAD-stable, continue metoprolol, atorvastatin  * Hyperlipidemia-continue atorvastatin, fenofibrate  * Insomnia-continue Trazadone, temazepam  * Proph- heparin  * Disp-OOB, PT, d/c planning   * Comm- d/w pt, RN

## 2018-04-20 NOTE — DISCHARGE NOTE ADULT - HOSPITAL COURSE
H&P:  Pt is a 62y Female   PAST MEDICAL & SURGICAL HISTORY:  Right bundle branch block  Back pain  Pain management  Insomnia  Knee pain, right  Osteoarthritis  Morbid obesity  Asthma  Spinal stenosis  Sleep apnea: resolved with gastric sleeve surgery  Hyperlipidemia  Hypertension  Myocardial infarction:   History of esophagogastroduodenoscopy (EGD)  S/P colonoscopy  S/P laparoscopic sleeve gastrectomy:   S/P : 2980, 1982  Status post lung surgery: age 6 left upper lobectomy  S/P cholecystectomy:   S/P shoulder replacement, left:   S/P CABG x 2:        Now s/p Right Total Knee Arthroplasty. Pt is afebrile with stable vital signs. Pain is controlled. Alert and Oriented. Exam reveals intact EHL FHL TA GS, +DP. Dressing is clean and dry with a New Aquacel bandage on.    Vital Signs ****     Labs ****    Hospital Course:  Patient presented to [facility] medically cleared for elective Right Total Knee Replacement Surgery, having failed outpatient conservative management. Prophylactic antibiotics were started before the procedure and continued for 24 hours. They were admitted after surgery to the orthopedic floor.  There were no complications during the hospital stay. All home medications were continued. **Pt received **U [facility] post op for Acute Blood loss Anemia.    Routine consults were obtained from the Anticoagulation Team for DVT/PE prophylaxis, from Physical Therapy for twice daily PT, and from the Hospitalist for Medical Co-management. Patient was placed on Coumadin and SC heparin until therapeutic for anticoagulation; SC Heparin to be switched to SC Lovenox on discharge home.  Pertinent home medications were continued.  Daily labs were followed.      On POD 0 pt was stable overnight. Pt received twice daily PT and a new Aquacel dressing was applied prior to discharge. The plan is for DC to home with home PT.  The orthopedic Attending is aware and agrees. H&P:  Pt is a 62y Female   PAST MEDICAL & SURGICAL HISTORY:  Right bundle branch block  Back pain  Pain management  Insomnia  Knee pain, right  Osteoarthritis  Morbid obesity  Asthma  Spinal stenosis  Sleep apnea: resolved with gastric sleeve surgery  Hyperlipidemia  Hypertension  Myocardial infarction:   History of esophagogastroduodenoscopy (EGD)  S/P colonoscopy  S/P laparoscopic sleeve gastrectomy:   S/P : 2980, 1982  Status post lung surgery: age 6 left upper lobectomy  S/P cholecystectomy:   S/P shoulder replacement, left:   S/P CABG x 2:        Now s/p Right Total Knee Arthroplasty. Pt is afebrile with stable vital signs. Pain is controlled. Alert and Oriented. Exam reveals intact EHL FHL TA GS, +DP. Dressing is clean and dry with a New Aquacel bandage on.    Hospital Course:  Patient presented to [facility] medically cleared for elective Right Total Knee Replacement Surgery, having failed outpatient conservative management. Prophylactic antibiotics were started before the procedure and continued for 24 hours. They were admitted after surgery to the orthopedic floor.  There were no complications during the hospital stay. All home medications were continued.    Routine consults were obtained from the Anticoagulation Team for DVT/PE prophylaxis, from Physical Therapy for twice daily PT, and from the Hospitalist for Medical Co-management. Patient was placed on Coumadin and SC heparin until therapeutic for anticoagulation; SC Heparin to be switched to SC Lovenox on discharge home.  Pertinent home medications were continued.  Daily labs were followed.      On POD 0 pt was stable overnight. Pt received twice daily PT and a new Aquacel dressing was applied prior to discharge. The plan is for DC to home with home PT.  The orthopedic Attending is aware and agrees.

## 2018-04-20 NOTE — DISCHARGE NOTE ADULT - MEDICATION SUMMARY - MEDICATIONS TO CHANGE
I will SWITCH the dose or number of times a day I take the medications listed below when I get home from the hospital:    temazepam 30 mg oral capsule  -- 1 cap(s) by mouth once a day (at bedtime) I will SWITCH the dose or number of times a day I take the medications listed below when I get home from the hospital:  None

## 2018-04-20 NOTE — CONSULT NOTE ADULT - ASSESSMENT
A/P  63 yo female S/P left TKR, high thrombosis risk due to age, surgery, immobility and high BMI      Discussed the necessity of VTE prophylaxis with coumadin. Educated patient on INR, value, meaning, and effects medications and foods may have on it. Will further reinforce coumadin education and follow up on outpatient basis.     Plan:  Coumadin 5 mg PO daily x 4 weeks total adjust dose per INR  Heparin 5,000 units SQ Q8hour  Daily PT/INR  Daily CBC/BMP  Enc ambulation  Venodynes    Thank you for this consult, will continue to follow.
Pt is a 61 y/o obese female with h/o CAD s/p CABG, HTN, hyperlipidemia, insomnia, osteoarthritis who has been having increasing Rt knee pain due to osteoarthritis.  Her pain has been getting worse over the years, affecting her ADLs and quality of live therefore she was admitted for elective Rt total knee arthroplasty.  Post-op medicine consult called for comanagement.      * Osteoarthritis-s/p Rt TKA, continue pain control, PT, monitor post-op labs, DVT proph, encourage use of incentive spirometry.  * Acute Blood Loss Anemia-surgical loss, monitor, po iron.  * CAD-stable, continue metoprolol, atorvastatin  * Hyperlipidemia-continue atorvastatin, fenofibrate  * Insomnia-continue Trazadone, temazepam  * Proph- aspirin per ortho  * Disp-OOB, PT  * Comm- d/w pt

## 2018-04-20 NOTE — DISCHARGE NOTE ADULT - CARE PLAN
Principal Discharge DX:	Primary osteoarthritis of right knee  Goal:	Improved pain, Improved Function, Return to ADLs  Assessment and plan of treatment:	Discharge Instructions for Total Knee Arthroplasty    1.  Diet: Resume previous diet  2. Activity: WBAT, Rolling walker, Daily PT. Gentle ROM 0-full as tolerated. Walk plenty.  Wear immobilizer only while asleep x 3 weeks.   3. Call with: fever over 101, wound redness, drainage or open area, calf pain/calf swelling  4. Wound Care: Remove old and place new Aquacel  bandage to Knee every 7 days. RN can Remove Sutures Post Op Day #14 (5/3/18) so long as wound is healed, no drainage or open area. OK to Shower with Aquacel; Must be and Aquacel bandage to shower.  Avoid direct water beating on bandage.  Continue ICE packs to knee.  5. DVT PE Prophylaxis: Managed by Anticoag Team. See Anticoagulation Instructions. See Med Rec.  6. Continue Protonix daily while on Anticoagulant. an eRx has been sent to your pharmacy.  7. Labs: Check H&H weekly while on Anticoagulation. Check INR while on Coumadin.  8. Follow Up: Dr. Arredondo 2 weeks in office. Call to schedule.  9. Pain medications:  If going home, eRX sent to your pharmacy for . Principal Discharge DX:	Primary osteoarthritis of right knee  Goal:	Improved pain, Improved Function, Return to ADLs  Assessment and plan of treatment:	Discharge Instructions for Right Total Knee Arthroplasty    1.  Diet: Resume previous diet  2. Activity: WBAT, Rolling walker, Daily PT. Gentle ROM 0-full as tolerated. Walk plenty.  Wear immobilizer only while asleep x 3 weeks.   3. Call with: fever over 101, wound redness, drainage or open area, calf pain/calf swelling  4. Wound Care: Remove old and place new Aquacel  bandage to Knee every 7 days. RN can Remove Sutures Post Op Day #14 (5/3/18) so long as wound is healed, no drainage or open area. OK to Shower with Aquacel; Must be and Aquacel bandage to shower.  Avoid direct water beating on bandage.  Continue ICE packs to knee.  5. DVT PE Prophylaxis: Managed by Anticoag Team. See Anticoagulation Instructions. See Med Rec.  6. Continue Protonix daily while on Anticoagulant. an eRx has been sent to your pharmacy.  7. Labs: Check H&H weekly while on Anticoagulation. Check INR while on Coumadin.  8. Follow Up: Dr. Arredondo 2 weeks in office. Call to schedule.  9. Pain medications:  See med rec.

## 2018-04-20 NOTE — CONSULT NOTE ADULT - SUBJECTIVE AND OBJECTIVE BOX
HPI:      Patient is a 62y old  Female who presents with a chief complaint of inc left knee pain, h/o OA, now S/P left "total knee replacement right" (2018 10:15)      Consulted by Dr. Arredondo  for VTE prophylaxis, risk stratification, and anticoagulation management.    PAST MEDICAL & SURGICAL HISTORY:  Right bundle branch block  Back pain  Pain management  Insomnia  Knee pain, right  Osteoarthritis  Morbid obesity  Asthma  Spinal stenosis  Sleep apnea: resolved with gastric sleeve surgery  Hyperlipidemia  Hypertension  Myocardial infarction:   History of esophagogastroduodenoscopy (EGD)  S/P colonoscopy  S/P laparoscopic sleeve gastrectomy:   S/P : 2980, 1982  Status post lung surgery: age 6 left upper lobectomy  S/P cholecystectomy:   S/P shoulder replacement, left:   S/P CABG x 2:           CrCl: 78    Caprini VTE Risk Score: #8    IMPROVE Bleeding Risk Score # 1.5    Falls Risk:   High ( x )  Mod (  )  Low (  )      FAMILY HISTORY:  Family history of stroke (Mother)  Family history of heart disease (Father, Mother)    Denies any personal or familial history of clotting or bleeding disorders.    Allergies    No Known Allergies    Intolerances        REVIEW OF SYSTEMS    (  )Fever	     (  )Constipation	(  )SOB				(  )Headache	(  )Dysuria  (  )Chills	     (  )Melena	(  )Dyspnea present on exertion	                    (  )Dizziness                    (  )Polyuria  (  )Nausea	     (  )Hematochezia	(  )Cough			                    (  )Syncope   	(  )Hematuria  (  )Vomiting    (  )Chest Pain	(  )Wheezing			(  )Weakness  (  )Diarrhea     (  )Palpitations	(  )Anorexia			(  )Myalgia    All other review of systems negative: Yes        PHYSICAL EXAM:    Constitutional: Appears Well    Neurological: A& O x 3    Skin: Warm    Respiratory and Thorax: normal effort; Breath sounds: normal; No rales/wheezing/rhonchi  	  Cardiovascular: S1, S2, regular, NMBR	    Gastrointestinal: BS + x 4Q, nontender	    Genitourinary:  Bladder nondistended, nontender    Musculoskeletal:   General Right:   no muscle/joint tenderness,   normal tone, no joint swelling,   ROM: full	    General Left:   + muscle/joint tenderness,   normal tone, no joint swelling,   ROM: limited          Knee:               Left: Incision: ; Dressing CDI; Drain: hemovac ; Type of drng.: serosang.; Amt. of drng: small  Lower extrems:   Right: no calf tenderness              negative anthony's sign               + pedal pulses    Left:   no calf tenderness              negative anthony's sign               + pedal pulses                          11.0   8.17  )-----------( 167      ( 2018 06:28 )             34.2           142  |  109<H>  |  13  ----------------------------<  97  4.2   |  28  |  1.37<H>    Ca    8.3<L>      2018 06:28        PT/INR - ( 2018 09:33 )   PT: 10.6 sec;   INR: 0.98 ratio         PTT - ( 2018 09:33 )  PTT:27.0 sec				    MEDICATIONS  (STANDING):  acetaminophen   Tablet 650 milliGRAM(s) Oral every 6 hours  ascorbic acid 500 milliGRAM(s) Oral two times a day  atorvastatin 20 milliGRAM(s) Oral at bedtime  calcium carbonate 1250 mG + Vitamin D (OsCal 500 + D) 1 Tablet(s) Oral three times a day  celecoxib 200 milliGRAM(s) Oral with breakfast  docusate sodium 100 milliGRAM(s) Oral every 12 hours  fenofibrate Tablet 48 milliGRAM(s) Oral daily  ferrous    sulfate 325 milliGRAM(s) Oral three times a day with meals  folic acid 1 milliGRAM(s) Oral daily  gabapentin 300 milliGRAM(s) Oral three times a day  heparin  Injectable 5000 Unit(s) SubCutaneous every 8 hours  lactated ringers. 1000 milliLiter(s) IV Continuous <Continuous>  metoprolol tartrate 50 milliGRAM(s) Oral two times a day  multivitamin 1 Tablet(s) Oral daily  oxyCODONE  ER Tablet 10 milliGRAM(s) Oral every 12 hours  pantoprazole    Tablet 40 milliGRAM(s) Oral daily  polyethylene glycol 3350 17 Gram(s) Oral daily  sodium chloride 0.9% lock flush 3 milliLiter(s) IV Push every 8 hours  traZODone 100 milliGRAM(s) Oral at bedtime  warfarin 5 milliGRAM(s) Oral daily      Vital Signs Last 24 Hrs  T(C): 36.9 (18 @ 05:29), Max: 36.9 (18 @ 05:29)  T(F): 98.4 (18 @ 05:29), Max: 98.4 (18 @ 05:29)  HR: 69 (18 @ 05:29) (44 - 69)  BP: 121/47 (18 @ 05:29) (98/59 - 149/72)  BP(mean): --  RR: 16 (18 @ 05:29) (11 - 17)  SpO2: 95% (18 @ 05:29) (95% - 100%)    DVT Prophylaxis:  LMWH                   (  )  Heparin SQ           (x  )  Coumadin             (x  )  Xarelto                  (  )  Eliquis                   (  )  Venodynes           ( x )  Ambulation          ( x )  UFH                       (  )  Contraindicated  (  )
Date of Service 18 @ 18:47    Patient is a 62y old  Female who presents with a chief complaint of "I had my surgery"      HPI: Pt is a 63 y/o obese female with h/o CAD s/p CABG, HTN, hyperlipidemia, insomnia, osteoarthritis who has been having increasing Rt knee pain due to osteoarthritis.  Her pain has been getting worse over the years, affecting her ADLs and quality of live therefore she was admitted for elective Rt total knee arthroplasty.  Post-op medicine consult called for comanagement.  Pt seen in 2N, c/o Rt knee pain, no CP or SOB.      PAST MEDICAL & SURGICAL HISTORY:  Right bundle branch block  Back pain  Pain management  Insomnia  Knee pain, right  Osteoarthritis  Morbid obesity  Asthma  Spinal stenosis  Sleep apnea: resolved with gastric sleeve surgery  Hyperlipidemia  Hypertension  Myocardial infarction:   History of esophagogastroduodenoscopy (EGD)  S/P colonoscopy  S/P laparoscopic sleeve gastrectomy:   S/P : 2980, 1982  Status post lung surgery: age 6 left upper lobectomy  S/P cholecystectomy:   S/P shoulder replacement, left:   S/P CABG x 2:       Allergies    No Known Allergies    Intolerances        MEDICATIONS  (STANDING):  acetaminophen   Tablet 650 milliGRAM(s) Oral every 6 hours  ascorbic acid 500 milliGRAM(s) Oral two times a day  atorvastatin 20 milliGRAM(s) Oral at bedtime  calcium carbonate 1250 mG + Vitamin D (OsCal 500 + D) 1 Tablet(s) Oral three times a day  ceFAZolin   IVPB 2000 milliGRAM(s) IV Intermittent every 6 hours  celecoxib 200 milliGRAM(s) Oral with breakfast  docusate sodium 100 milliGRAM(s) Oral every 12 hours  fenofibrate Tablet 48 milliGRAM(s) Oral daily  ferrous    sulfate 325 milliGRAM(s) Oral three times a day with meals  folic acid 1 milliGRAM(s) Oral daily  gabapentin 300 milliGRAM(s) Oral three times a day  lactated ringers. 1000 milliLiter(s) (75 mL/Hr) IV Continuous <Continuous>  metoprolol tartrate 50 milliGRAM(s) Oral two times a day  multivitamin 1 Tablet(s) Oral daily  oxyCODONE  ER Tablet 10 milliGRAM(s) Oral every 12 hours  pantoprazole    Tablet 40 milliGRAM(s) Oral daily  polyethylene glycol 3350 17 Gram(s) Oral daily  sodium chloride 0.9% lock flush 3 milliLiter(s) IV Push every 8 hours  traZODone 100 milliGRAM(s) Oral at bedtime    MEDICATIONS  (PRN):  acetaminophen   Tablet 650 milliGRAM(s) Oral every 6 hours PRN For Temp over 38.3 C (100.94 F)  acetaminophen   Tablet. 650 milliGRAM(s) Oral every 6 hours PRN Headache  aluminum hydroxide/magnesium hydroxide/simethicone Suspension 30 milliLiter(s) Oral four times a day PRN Indigestion  benzocaine 15 mG/menthol 3.6 mG Lozenge 1 Lozenge Oral every 2 hours PRN Sore Throat  diphenhydrAMINE   Capsule 25 milliGRAM(s) Oral at bedtime PRN Insomnia  diphenhydrAMINE   Capsule 25 milliGRAM(s) Oral every 4 hours PRN Rash and/or Itching  HYDROmorphone  Injectable 0.5 milliGRAM(s) IV Push every 3 hours PRN Severe Pain (7 - 10)  magnesium hydroxide Suspension 30 milliLiter(s) Oral daily PRN Constipation  ondansetron Injectable 4 milliGRAM(s) IV Push every 6 hours PRN Nausea and/or Vomiting  ondansetron Injectable 4 milliGRAM(s) IV Push every 6 hours PRN Nausea and/or Vomiting  oxyCODONE    IR 5 milliGRAM(s) Oral every 4 hours PRN Mild Pain (1 - 3)  oxyCODONE    IR 10 milliGRAM(s) Oral every 4 hours PRN Moderate Pain (4 - 6)  senna 2 Tablet(s) Oral at bedtime PRN Constipation  temazepam 30 milliGRAM(s) Oral at bedtime PRN Insomnia      FAMILY HISTORY:  Family history of stroke (Mother)  Family history of heart disease (Father, Mother)      Social History: , lives with spouse, social ETOH use, never smoked.      Vital Signs Last 24 Hrs  T(C): 36.2 (2018 17:48), Max: 36.7 (2018 10:09)  T(F): 97.2 (2018 17:48), Max: 98.1 (2018 10:09)  HR: 46 (2018 17:48) (44 - 53)  BP: 149/72 (2018 17:48) (98/59 - 151/83)  BP(mean): --  RR: 17 (2018 17:48) (11 - 17)  SpO2: 100% (2018 17:48) (100% - 100%)    Daily Height in cm: 162.56 (2018 10:09)    Daily     I&O's Summary    2018 07:01  -  2018 18:47  --------------------------------------------------------  IN: 1902 mL / OUT: 0 mL / NET: 1902 mL          Data                          11.4   6.73  )-----------( 177      ( 2018 16:04 )             36.2       04-19    143  |  109<H>  |  11  ----------------------------<  88  4.3   |  29  |  1.19    Ca    8.8      2018 16:04                      PT/INR - ( 2018 09:33 )   PT: 10.6 sec;   INR: 0.98 ratio         PTT - ( 2018 09:33 )  PTT:27.0 sec

## 2018-04-20 NOTE — DISCHARGE NOTE ADULT - CARE PROVIDER_API CALL
Igor Arredondo (MD), Orthopaedic Surgery  [address] [place] [address]  Phone: [phone]  Fax: [phone]

## 2018-04-20 NOTE — DISCHARGE NOTE ADULT - PATIENT PORTAL LINK FT
You can access the [facility] Patient Portal, offered by [facility], by registering with the following website: [email or web address]

## 2018-04-20 NOTE — DISCHARGE NOTE ADULT - MEDICATION SUMMARY - MEDICATIONS TO TAKE
I will START or STAY ON the medications listed below when I get home from the hospital:    oxyMORphone 10 mg oral tablet, extended release  -- 1 tab(s) by mouth every 12 hours; prescribed by outside pain management Dr. Carl  -- Indication: For Home med; Pain control, managed by outside Pain management doctor.    HYDROcodone-acetaminophen 7.5 mg-325 mg oral tablet  -- 1 tab(s) by mouth 2 times a day, As Needed; prescribed by outside pain management Dr. Carl  -- Indication: For Home med; Pain control, managed by outside Pain management doctor.    warfarin 5 mg oral tablet  -- 1 tab(s) by mouth once a day (in the evening)   -- Do not take this drug if you are pregnant.  It is very important that you take or use this exactly as directed.  Do not skip doses or discontinue unless directed by your doctor.  Obtain medical advice before taking any non-prescription drugs as some may affect the action of this medication.    -- Indication: For Blood clot prevention; dosing per INR and Anticoagulation Team    enoxaparin 40 mg/0.4 mL injectable solution  -- 40 milligram(s) subcutaneously every 12 hours   -- It is very important that you take or use this exactly as directed.  Do not skip doses or discontinue unless directed by your doctor.    -- Indication: For Blood clot prevention; dosing per INR and Anticoagulation Team; discontinue when INR therapeutic    gabapentin 300 mg oral capsule  -- 1 cap(s) by mouth 3 times a day  -- Indication: For Home med    traZODone 100 mg oral tablet  -- 1 tab(s) by mouth once a day (at bedtime)  -- Indication: For Home med    fenofibrate 48 mg oral tablet  -- 1 tab(s) by mouth once a day  -- Indication: For Home med    atorvastatin 20 mg oral tablet  -- 1 tab(s) by mouth once a day  -- Indication: For Home med    temazepam 30 mg oral capsule  -- 1 cap(s) by mouth once a day (at bedtime)  -- Indication: For Home med; do not take with narcotic pain medications    metoprolol tartrate 50 mg oral tablet  -- 1 tab(s) by mouth 2 times a day will hold pending PCP  -- Indication: For Home med    Colace 100 mg oral capsule  -- 1 cap(s) by mouth 2 times a day while taking Percocet  -- Medication should be taken with plenty of water.    -- Indication: For Home med    tiZANidine 4 mg oral tablet  -- 1 tab(s) by mouth every 8 hours, As Needed  -- Indication: For Home med    Protonix 40 mg oral delayed release tablet  -- 1 tab(s) by mouth once a day while on blood clot prevention medications  -- It is very important that you take or use this exactly as directed.  Do not skip doses or discontinue unless directed by your doctor.  Obtain medical advice before taking any non-prescription drugs as some may affect the action of this medication.  Swallow whole.  Do not crush.    -- Indication: For GI protection while taking blood clot prevention medications

## 2018-04-20 NOTE — CHART NOTE - NSCHARTNOTEFT_GEN_A_CORE
Ortho Note:    Spoke with patient regarding home pain medications.    Pt follows with Pain Management Dr. Carl as outpatient and is scheduled for follow-up appointment on 5/2.   Pt reports she has a 2 week supply of both Hydrocodone-Acetamenophen and Oxymorphone ER at home.

## 2018-04-20 NOTE — DISCHARGE NOTE ADULT - NS AS ACTIVITY OBS
No Heavy lifting/straining/Showering allowed/Walking-Outdoors allowed/Stairs allowed/Walking-Indoors allowed

## 2018-04-21 VITALS
HEART RATE: 69 BPM | OXYGEN SATURATION: 100 % | DIASTOLIC BLOOD PRESSURE: 49 MMHG | RESPIRATION RATE: 16 BRPM | TEMPERATURE: 98 F | SYSTOLIC BLOOD PRESSURE: 108 MMHG

## 2018-04-21 LAB
ANION GAP SERPL CALC-SCNC: 4 MMOL/L — LOW (ref 5–17)
BUN SERPL-MCNC: 17 MG/DL — SIGNIFICANT CHANGE UP (ref 7–23)
CALCIUM SERPL-MCNC: 8.5 MG/DL — SIGNIFICANT CHANGE UP (ref 8.5–10.1)
CHLORIDE SERPL-SCNC: 108 MMOL/L — SIGNIFICANT CHANGE UP (ref 96–108)
CO2 SERPL-SCNC: 28 MMOL/L — SIGNIFICANT CHANGE UP (ref 22–31)
CREAT SERPL-MCNC: 1.26 MG/DL — SIGNIFICANT CHANGE UP (ref 0.5–1.3)
GLUCOSE SERPL-MCNC: 105 MG/DL — HIGH (ref 70–99)
HCT VFR BLD CALC: 33.8 % — LOW (ref 34.5–45)
HGB BLD-MCNC: 11 G/DL — LOW (ref 11.5–15.5)
INR BLD: 1.28 RATIO — HIGH (ref 0.88–1.16)
MCHC RBC-ENTMCNC: 31.7 PG — SIGNIFICANT CHANGE UP (ref 27–34)
MCHC RBC-ENTMCNC: 32.5 GM/DL — SIGNIFICANT CHANGE UP (ref 32–36)
MCV RBC AUTO: 97.4 FL — SIGNIFICANT CHANGE UP (ref 80–100)
NRBC # BLD: 0 /100 WBCS — SIGNIFICANT CHANGE UP (ref 0–0)
PLATELET # BLD AUTO: 171 K/UL — SIGNIFICANT CHANGE UP (ref 150–400)
POTASSIUM SERPL-MCNC: 4.2 MMOL/L — SIGNIFICANT CHANGE UP (ref 3.5–5.3)
POTASSIUM SERPL-SCNC: 4.2 MMOL/L — SIGNIFICANT CHANGE UP (ref 3.5–5.3)
PROTHROM AB SERPL-ACNC: 13.9 SEC — HIGH (ref 9.8–12.7)
RBC # BLD: 3.47 M/UL — LOW (ref 3.8–5.2)
RBC # FLD: 13.2 % — SIGNIFICANT CHANGE UP (ref 10.3–14.5)
SODIUM SERPL-SCNC: 140 MMOL/L — SIGNIFICANT CHANGE UP (ref 135–145)
WBC # BLD: 9.35 K/UL — SIGNIFICANT CHANGE UP (ref 3.8–10.5)
WBC # FLD AUTO: 9.35 K/UL — SIGNIFICANT CHANGE UP (ref 3.8–10.5)

## 2018-04-21 PROCEDURE — 99233 SBSQ HOSP IP/OBS HIGH 50: CPT

## 2018-04-21 RX ADMIN — HYDROMORPHONE HYDROCHLORIDE 1 MILLIGRAM(S): 2 INJECTION INTRAMUSCULAR; INTRAVENOUS; SUBCUTANEOUS at 05:34

## 2018-04-21 RX ADMIN — CELECOXIB 200 MILLIGRAM(S): 200 CAPSULE ORAL at 08:50

## 2018-04-21 RX ADMIN — Medication 325 MILLIGRAM(S): at 08:51

## 2018-04-21 RX ADMIN — HYDROMORPHONE HYDROCHLORIDE 1 MILLIGRAM(S): 2 INJECTION INTRAMUSCULAR; INTRAVENOUS; SUBCUTANEOUS at 06:00

## 2018-04-21 RX ADMIN — GABAPENTIN 300 MILLIGRAM(S): 400 CAPSULE ORAL at 05:36

## 2018-04-21 RX ADMIN — HYDROMORPHONE HYDROCHLORIDE 4 MILLIGRAM(S): 2 INJECTION INTRAMUSCULAR; INTRAVENOUS; SUBCUTANEOUS at 04:17

## 2018-04-21 RX ADMIN — Medication 100 MILLIGRAM(S): at 05:36

## 2018-04-21 RX ADMIN — HEPARIN SODIUM 5000 UNIT(S): 5000 INJECTION INTRAVENOUS; SUBCUTANEOUS at 05:34

## 2018-04-21 RX ADMIN — Medication 650 MILLIGRAM(S): at 05:35

## 2018-04-21 RX ADMIN — HYDROMORPHONE HYDROCHLORIDE 4 MILLIGRAM(S): 2 INJECTION INTRAMUSCULAR; INTRAVENOUS; SUBCUTANEOUS at 03:17

## 2018-04-21 RX ADMIN — SODIUM CHLORIDE 3 MILLILITER(S): 9 INJECTION INTRAMUSCULAR; INTRAVENOUS; SUBCUTANEOUS at 05:27

## 2018-04-21 RX ADMIN — OXYCODONE HYDROCHLORIDE 10 MILLIGRAM(S): 5 TABLET ORAL at 08:52

## 2018-04-21 NOTE — PROGRESS NOTE ADULT - ASSESSMENT
A/P: 62F s/p R TKA POD 2  Pain Control  DVT ppx  WBAT  PT/OT  Incentive Spirometry  Medical management appreciated  DC planning

## 2018-04-21 NOTE — PROGRESS NOTE ADULT - SUBJECTIVE AND OBJECTIVE BOX
Orthopedics     POD 1 Right Total Knee Arthroplasty  Pain is controlled. Pt feeling well. No nausea or vomiting. Has been OOB with PT.    Vital Signs Last 24 Hrs  T(C): 36.9 (04-20-18 @ 05:29), Max: 36.9 (04-20-18 @ 05:29)  T(F): 98.4 (04-20-18 @ 05:29), Max: 98.4 (04-20-18 @ 05:29)  HR: 69 (04-20-18 @ 05:29) (44 - 69)  BP: 121/47 (04-20-18 @ 05:29) (98/59 - 151/83)  BP(mean): --  RR: 16 (04-20-18 @ 05:29) (11 - 17)  SpO2: 95% (04-20-18 @ 05:29) (95% - 100%)                        11.0   8.17  )-----------( 167      ( 20 Apr 2018 06:28 )             34.2     20 Apr 2018 06:28    142    |  109    |  13     ----------------------------<  97     4.2     |  28     |  1.37     Ca    8.3        20 Apr 2018 06:28      PT/INR - ( 19 Apr 2018 09:33 )   PT: 10.6 sec;   INR: 0.98 ratio         PTT - ( 19 Apr 2018 09:33 )  PTT:27.0 sec  Exam:  NAD AAOx3  Dressing clean and dry  +EHL FHL TA GS  SILT toes 1-5  +DP  Calf Soft NT    A/P:  Stable POD 1 Right Total Knee Arthroplasty  -Analgesia  -DVT PE ppx  -OOB PT
HPI:      Patient is a 62y old  Female who presents with a chief complaint of inc left knee pain, h/o OA, now S/P left "total knee replacement right" (2018 10:15)      Consulted by Dr. Arredondo  for VTE prophylaxis, risk stratification, and anticoagulation management.    PAST MEDICAL & SURGICAL HISTORY:  Right bundle branch block  Back pain  Pain management  Insomnia  Knee pain, right  Osteoarthritis  Morbid obesity  Asthma  Spinal stenosis  Sleep apnea: resolved with gastric sleeve surgery  Hyperlipidemia  Hypertension  Myocardial infarction:   History of esophagogastroduodenoscopy (EGD)  S/P colonoscopy  S/P laparoscopic sleeve gastrectomy:   S/P : 2980, 1982  Status post lung surgery: age 6 left upper lobectomy  S/P cholecystectomy:   S/P shoulder replacement, left:   S/P CABG x 2:           CrCl: 78    Caprini VTE Risk Score: #8    IMPROVE Bleeding Risk Score # 1.5    Falls Risk:   High ( x )  Mod (  )  Low (  )    : Patient seen at bedside preparing to go home. Spoke with daughter and patient. Daughter to give lovenox injections. Instructed on technique and importance of twice daily 12 hours apart. Both verbalized understanding.    FAMILY HISTORY:  Family history of stroke (Mother)  Family history of heart disease (Father, Mother)    Denies any personal or familial history of clotting or bleeding disorders.    Allergies    No Known Allergies    Intolerances        REVIEW OF SYSTEMS    (  )Fever	     (  )Constipation	(  )SOB				(  )Headache	(  )Dysuria  (  )Chills	     (  )Melena	(  )Dyspnea present on exertion	                    (  )Dizziness                    (  )Polyuria  (  )Nausea	     (  )Hematochezia	(  )Cough			                    (  )Syncope   	(  )Hematuria  (  )Vomiting    (  )Chest Pain	(  )Wheezing			(  )Weakness  (  )Diarrhea     (  )Palpitations	(  )Anorexia			(  )Myalgia    All other review of systems negative: Yes        PHYSICAL EXAM:    Constitutional: Appears Well    Neurological: A& O x 3    Skin: Warm    Respiratory and Thorax: normal effort; Breath sounds: normal; No rales/wheezing/rhonchi  	  Cardiovascular: S1, S2, regular, NMBR	    Gastrointestinal: BS + x 4Q, nontender	    Genitourinary:  Bladder nondistended, nontender    Musculoskeletal:   General Right:   no muscle/joint tenderness,   normal tone, no joint swelling,   ROM: full	    General Left:   + muscle/joint tenderness,   normal tone, no joint swelling,   ROM: limited          Knee: Left: Incision: ; Dressing CDI;   Lower extrems:   Right: no calf tenderness              negative anthony's sign               + pedal pulses    Left:   no calf tenderness              negative anthony's sign               + pedal pulses                          11.0   9.35  )-----------( 171      ( 2018 06:10 )             33.8       04-21    140  |  108  |  17  ----------------------------<  105<H>  4.2   |  28  |  1.26    Ca    8.5      2018 06:10        PT/INR - ( 2018 06:10 )   PT: 13.9 sec;   INR: 1.28 ratio                                 11.0   8.17  )-----------( 167      ( 2018 06:28 )             34.2       04-20    142  |  109<H>  |  13  ----------------------------<  97  4.2   |  28  |  1.37<H>    Ca    8.3<L>      2018 06:28        PT/INR - ( 2018 09:33 )   PT: 10.6 sec;   INR: 0.98 ratio         PTT - ( 2018 09:33 )  PTT:27.0 sec				    MEDICATIONS  (STANDING):  acetaminophen   Tablet 650 milliGRAM(s) Oral every 6 hours  ascorbic acid 500 milliGRAM(s) Oral two times a day  atorvastatin 20 milliGRAM(s) Oral at bedtime  calcium carbonate 1250 mG + Vitamin D (OsCal 500 + D) 1 Tablet(s) Oral three times a day  celecoxib 200 milliGRAM(s) Oral with breakfast  docusate sodium 100 milliGRAM(s) Oral every 12 hours  fenofibrate Tablet 48 milliGRAM(s) Oral daily  ferrous    sulfate 325 milliGRAM(s) Oral three times a day with meals  folic acid 1 milliGRAM(s) Oral daily  gabapentin 300 milliGRAM(s) Oral three times a day  heparin  Injectable 5000 Unit(s) SubCutaneous every 8 hours  lactated ringers. 1000 milliLiter(s) IV Continuous <Continuous>  metoprolol tartrate 50 milliGRAM(s) Oral two times a day  multivitamin 1 Tablet(s) Oral daily  oxyCODONE  ER Tablet 10 milliGRAM(s) Oral every 12 hours  pantoprazole    Tablet 40 milliGRAM(s) Oral daily  polyethylene glycol 3350 17 Gram(s) Oral daily  sodium chloride 0.9% lock flush 3 milliLiter(s) IV Push every 8 hours  traZODone 100 milliGRAM(s) Oral at bedtime  warfarin 5 milliGRAM(s) Oral daily      Vital Signs Last 24 Hrs  T(C): 36.9 (18 @ 05:26), Max: 37.3 (18 @ 23:25)  T(F): 98.5 (18 @ 05:26), Max: 99.2 (18 @ 23:25)  HR: 69 (18 @ 05:26) (51 - 69)  BP: 108/49 (18 @ 05:26) (95/46 - 114/38)  BP(mean): --  RR: 16 (18 @ 05:26) (16 - 16)  SpO2: 100% (18 @ 05:26) (93% - 100%)    DVT Prophylaxis:  LMWH                   (  )  Heparin SQ           (x  )  Coumadin             (x  )  Xarelto                  (  )  Eliquis                   (  )  Venodynes           ( x )  Ambulation          ( x )  UFH                       (  )  Contraindicated  (  )
Pt S/E at bedside, no acute events overnight, pain controlled    Vital Signs Last 24 Hrs  T(C): 36.9 (20 Apr 2018 05:29), Max: 36.9 (20 Apr 2018 05:29)  T(F): 98.4 (20 Apr 2018 05:29), Max: 98.4 (20 Apr 2018 05:29)  HR: 69 (20 Apr 2018 05:29) (44 - 69)  BP: 121/47 (20 Apr 2018 05:29) (98/59 - 151/83)  BP(mean): --  RR: 16 (20 Apr 2018 05:29) (11 - 17)  SpO2: 95% (20 Apr 2018 05:29) (95% - 100%)    Gen: NAD,     Right Lower Extremity:  Dressing clean dry intact  +EHL/FHL/TA/GS  SILT L3-S1  +DP/PT Pulses  Compartments soft  No calf TTP B/L
Pt S/E at bedside, no acute events overnight, pain controlled    Vital Signs Last 24 Hrs  T(C): 36.9 (21 Apr 2018 05:26), Max: 37.3 (20 Apr 2018 23:25)  T(F): 98.5 (21 Apr 2018 05:26), Max: 99.2 (20 Apr 2018 23:25)  HR: 69 (21 Apr 2018 05:26) (51 - 69)  BP: 108/49 (21 Apr 2018 05:26) (95/46 - 114/38)  BP(mean): --  RR: 16 (21 Apr 2018 05:26) (16 - 16)  SpO2: 100% (21 Apr 2018 05:26) (93% - 100%)    Gen: NAD,     Right Lower Extremity:  Dressing clean dry intact  +EHL/FHL/TA/GS  SILT L3-S1  +DP/PT Pulses  Compartments soft  No calf TTP B/L
Pt c/o Rt knee pain overnight, no new complaints.      Date of Service: 04-20-18 @ 09:33    Vital Signs Last 24 Hrs  T(C): 36.9 (20 Apr 2018 05:29), Max: 36.9 (20 Apr 2018 05:29)  T(F): 98.4 (20 Apr 2018 05:29), Max: 98.4 (20 Apr 2018 05:29)  HR: 69 (20 Apr 2018 05:29) (44 - 69)  BP: 121/47 (20 Apr 2018 05:29) (98/59 - 151/83)  BP(mean): --  RR: 16 (20 Apr 2018 05:29) (11 - 17)  SpO2: 95% (20 Apr 2018 05:29) (95% - 100%)    Daily Height in cm: 162.56 (19 Apr 2018 10:09)    Daily     I&O's Detail    19 Apr 2018 07:01  -  20 Apr 2018 07:00  --------------------------------------------------------  IN:    lactated ringers.: 102 mL    Other: 1800 mL  Total IN: 1902 mL    OUT:  Total OUT: 0 mL    Total NET: 1902 mL          CAPILLARY BLOOD GLUCOSE                                          11.0   8.17  )-----------( 167      ( 20 Apr 2018 06:28 )             34.2       04-20    142  |  109<H>  |  13  ----------------------------<  97  4.2   |  28  |  1.37<H>    Ca    8.3<L>      20 Apr 2018 06:28        PT/INR - ( 19 Apr 2018 09:33 )   PT: 10.6 sec;   INR: 0.98 ratio         PTT - ( 19 Apr 2018 09:33 )  PTT:27.0 sec                MEDICATIONS  (STANDING):  acetaminophen   Tablet 650 milliGRAM(s) Oral every 6 hours  ascorbic acid 500 milliGRAM(s) Oral two times a day  atorvastatin 20 milliGRAM(s) Oral at bedtime  calcium carbonate 1250 mG + Vitamin D (OsCal 500 + D) 1 Tablet(s) Oral three times a day  celecoxib 200 milliGRAM(s) Oral with breakfast  docusate sodium 100 milliGRAM(s) Oral every 12 hours  fenofibrate Tablet 48 milliGRAM(s) Oral daily  ferrous    sulfate 325 milliGRAM(s) Oral three times a day with meals  folic acid 1 milliGRAM(s) Oral daily  gabapentin 300 milliGRAM(s) Oral three times a day  heparin  Injectable 5000 Unit(s) SubCutaneous every 8 hours  lactated ringers. 1000 milliLiter(s) (75 mL/Hr) IV Continuous <Continuous>  metoprolol tartrate 50 milliGRAM(s) Oral two times a day  multivitamin 1 Tablet(s) Oral daily  oxyCODONE  ER Tablet 10 milliGRAM(s) Oral every 12 hours  pantoprazole    Tablet 40 milliGRAM(s) Oral daily  polyethylene glycol 3350 17 Gram(s) Oral daily  sodium chloride 0.9% lock flush 3 milliLiter(s) IV Push every 8 hours  traZODone 100 milliGRAM(s) Oral at bedtime    MEDICATIONS  (PRN):  acetaminophen   Tablet 650 milliGRAM(s) Oral every 6 hours PRN For Temp over 38.3 C (100.94 F)  acetaminophen   Tablet. 650 milliGRAM(s) Oral every 6 hours PRN Headache  aluminum hydroxide/magnesium hydroxide/simethicone Suspension 30 milliLiter(s) Oral four times a day PRN Indigestion  benzocaine 15 mG/menthol 3.6 mG Lozenge 1 Lozenge Oral every 2 hours PRN Sore Throat  diphenhydrAMINE   Capsule 25 milliGRAM(s) Oral at bedtime PRN Insomnia  diphenhydrAMINE   Capsule 25 milliGRAM(s) Oral every 4 hours PRN Rash and/or Itching  HYDROmorphone  Injectable 1 milliGRAM(s) SubCutaneous every 4 hours PRN Severe Pain (7 - 10)  magnesium hydroxide Suspension 30 milliLiter(s) Oral daily PRN Constipation  ondansetron Injectable 4 milliGRAM(s) IV Push every 6 hours PRN Nausea and/or Vomiting  ondansetron Injectable 4 milliGRAM(s) IV Push every 6 hours PRN Nausea and/or Vomiting  oxyCODONE    IR 5 milliGRAM(s) Oral every 4 hours PRN Mild Pain (1 - 3)  oxyCODONE    IR 10 milliGRAM(s) Oral every 4 hours PRN Moderate Pain (4 - 6)  senna 2 Tablet(s) Oral at bedtime PRN Constipation  temazepam 30 milliGRAM(s) Oral at bedtime PRN Insomnia
Pt with uneventful night, c/o mild Rt knee pain, no CP or SOB.  Pt wants to go home today.    Date of Service: 04-21-18 @ 08:56    Vital Signs Last 24 Hrs  T(C): 36.9 (21 Apr 2018 05:26), Max: 37.3 (20 Apr 2018 23:25)  T(F): 98.5 (21 Apr 2018 05:26), Max: 99.2 (20 Apr 2018 23:25)  HR: 69 (21 Apr 2018 05:26) (51 - 69)  BP: 108/49 (21 Apr 2018 05:26) (95/46 - 114/38)  BP(mean): --  RR: 16 (21 Apr 2018 05:26) (16 - 16)  SpO2: 100% (21 Apr 2018 05:26) (93% - 100%)    Daily     Daily     I&O's Detail      CAPILLARY BLOOD GLUCOSE                                          11.0   9.35  )-----------( 171      ( 21 Apr 2018 06:10 )             33.8       04-21    140  |  108  |  17  ----------------------------<  105<H>  4.2   |  28  |  1.26    Ca    8.5      21 Apr 2018 06:10        PT/INR - ( 21 Apr 2018 06:10 )   PT: 13.9 sec;   INR: 1.28 ratio         PTT - ( 19 Apr 2018 09:33 )  PTT:27.0 sec                MEDICATIONS  (STANDING):  acetaminophen   Tablet 650 milliGRAM(s) Oral every 6 hours  ascorbic acid 500 milliGRAM(s) Oral two times a day  atorvastatin 20 milliGRAM(s) Oral at bedtime  calcium carbonate 1250 mG + Vitamin D (OsCal 500 + D) 1 Tablet(s) Oral three times a day  celecoxib 200 milliGRAM(s) Oral with breakfast  docusate sodium 100 milliGRAM(s) Oral every 12 hours  fenofibrate Tablet 48 milliGRAM(s) Oral daily  ferrous    sulfate 325 milliGRAM(s) Oral three times a day with meals  folic acid 1 milliGRAM(s) Oral daily  gabapentin 300 milliGRAM(s) Oral three times a day  heparin  Injectable 5000 Unit(s) SubCutaneous every 8 hours  lactated ringers. 1000 milliLiter(s) (75 mL/Hr) IV Continuous <Continuous>  metoprolol tartrate 50 milliGRAM(s) Oral two times a day  multivitamin 1 Tablet(s) Oral daily  oxyCODONE  ER Tablet 10 milliGRAM(s) Oral every 12 hours  pantoprazole    Tablet 40 milliGRAM(s) Oral daily  polyethylene glycol 3350 17 Gram(s) Oral daily  sodium chloride 0.9% lock flush 3 milliLiter(s) IV Push every 8 hours  traZODone 100 milliGRAM(s) Oral at bedtime  warfarin 5 milliGRAM(s) Oral daily    MEDICATIONS  (PRN):  acetaminophen   Tablet 650 milliGRAM(s) Oral every 6 hours PRN For Temp over 38.3 C (100.94 F)  acetaminophen   Tablet. 650 milliGRAM(s) Oral every 6 hours PRN Headache  aluminum hydroxide/magnesium hydroxide/simethicone Suspension 30 milliLiter(s) Oral four times a day PRN Indigestion  benzocaine 15 mG/menthol 3.6 mG Lozenge 1 Lozenge Oral every 2 hours PRN Sore Throat  bisacodyl Suppository 10 milliGRAM(s) Rectal daily PRN If no bowel movement by postoperative day #2  diphenhydrAMINE   Capsule 25 milliGRAM(s) Oral at bedtime PRN Insomnia  diphenhydrAMINE   Capsule 25 milliGRAM(s) Oral every 4 hours PRN Rash and/or Itching  HYDROmorphone   Tablet 2 milliGRAM(s) Oral every 4 hours PRN Mild Pain (1 - 3)  HYDROmorphone   Tablet 4 milliGRAM(s) Oral every 4 hours PRN Moderate Pain (4 - 6)  HYDROmorphone  Injectable 1 milliGRAM(s) SubCutaneous every 4 hours PRN Severe Pain (7 - 10)  magnesium hydroxide Suspension 30 milliLiter(s) Oral daily PRN Constipation  ondansetron Injectable 4 milliGRAM(s) IV Push every 6 hours PRN Nausea and/or Vomiting  ondansetron Injectable 4 milliGRAM(s) IV Push every 6 hours PRN Nausea and/or Vomiting  senna 2 Tablet(s) Oral at bedtime PRN Constipation  temazepam 30 milliGRAM(s) Oral at bedtime PRN Insomnia
pt seen at bedside in PACU, in NAD, comfortable, no complaints. Min pain right knee denies N/T , N/V CP SOB    PE right knee   dressing and knee immobilizer intact   calf soft non tender   FROM ankle and toes  + EHL FHL GS TA   SILT toes 1-5   DP intact

## 2018-04-21 NOTE — PROGRESS NOTE ADULT - RS GEN PE MLT RESP DETAILS PC
clear to auscultation bilaterally/respirations non-labored/breath sounds equal/good air movement
respirations non-labored/breath sounds equal/good air movement/clear to auscultation bilaterally

## 2018-04-21 NOTE — PROGRESS NOTE ADULT - ASSESSMENT
Pt is a 63 y/o obese female with h/o CAD s/p CABG, HTN, hyperlipidemia, insomnia, osteoarthritis who has been having increasing Rt knee pain due to osteoarthritis.  Her pain has been getting worse over the years, affecting her ADLs and quality of live therefore she was admitted for elective Rt total knee arthroplasty.  Post-op medicine consult called for comanagement.      * Osteoarthritis-s/p Rt TKA, continue pain control, PT, DVT proph, encourage use of incentive spirometry.  * Acute Blood Loss Anemia-surgical loss, stable, monitor, po iron.  * CAD-stable, continue metoprolol, atorvastatin  * Hyperlipidemia-continue atorvastatin, fenofibrate  * Insomnia-continue Trazadone, temazepam  * Proph- heparin to coumadin  * Disp-OOB, PT, d/c planning for home today, pt is medically stable for d/c.  * Comm- d/w pt, NOBLE

## 2018-04-21 NOTE — PROGRESS NOTE ADULT - PROVIDER SPECIALTY LIST ADULT
Anticoag Management
Internal Medicine
Internal Medicine
Orthopedics

## 2018-04-21 NOTE — PROGRESS NOTE ADULT - ASSESSMENT
A/P  63 yo female S/P left TKR, high thrombosis risk due to age, surgery, immobility and high BMI      Discussed the necessity of VTE prophylaxis with coumadin. Educated patient on INR, value, meaning, and effects medications and foods may have on it. Will further reinforce coumadin education and follow up on outpatient basis.     Plan:  ::Increase Coumadin to 7.5 mg PO tonight- as per instructions given to patient for dc home  ::Lovenox 40mg SQ Q12hr due to BMI >35; until INR 2.0 or >  ::Labs to be drawn at home Mon/Thursday      Will continue to follow as outpatient

## 2018-04-23 ENCOUNTER — LABORATORY RESULT (OUTPATIENT)
Age: 63
End: 2018-04-23

## 2018-04-24 DIAGNOSIS — M48.00 SPINAL STENOSIS, SITE UNSPECIFIED: ICD-10-CM

## 2018-04-24 DIAGNOSIS — I10 ESSENTIAL (PRIMARY) HYPERTENSION: ICD-10-CM

## 2018-04-24 DIAGNOSIS — M17.11 UNILATERAL PRIMARY OSTEOARTHRITIS, RIGHT KNEE: ICD-10-CM

## 2018-04-24 DIAGNOSIS — J45.909 UNSPECIFIED ASTHMA, UNCOMPLICATED: ICD-10-CM

## 2018-04-24 DIAGNOSIS — I45.10 UNSPECIFIED RIGHT BUNDLE-BRANCH BLOCK: ICD-10-CM

## 2018-04-24 DIAGNOSIS — Z95.1 PRESENCE OF AORTOCORONARY BYPASS GRAFT: ICD-10-CM

## 2018-04-24 DIAGNOSIS — E78.00 PURE HYPERCHOLESTEROLEMIA, UNSPECIFIED: ICD-10-CM

## 2018-04-24 DIAGNOSIS — I25.2 OLD MYOCARDIAL INFARCTION: ICD-10-CM

## 2018-04-24 DIAGNOSIS — Z90.49 ACQUIRED ABSENCE OF OTHER SPECIFIED PARTS OF DIGESTIVE TRACT: ICD-10-CM

## 2018-04-24 DIAGNOSIS — Z98.84 BARIATRIC SURGERY STATUS: ICD-10-CM

## 2018-04-24 DIAGNOSIS — D62 ACUTE POSTHEMORRHAGIC ANEMIA: ICD-10-CM

## 2018-04-24 DIAGNOSIS — J98.4 OTHER DISORDERS OF LUNG: ICD-10-CM

## 2018-04-24 DIAGNOSIS — E66.01 MORBID (SEVERE) OBESITY DUE TO EXCESS CALORIES: ICD-10-CM

## 2018-04-24 DIAGNOSIS — G47.33 OBSTRUCTIVE SLEEP APNEA (ADULT) (PEDIATRIC): ICD-10-CM

## 2018-04-24 DIAGNOSIS — M50.30 OTHER CERVICAL DISC DEGENERATION, UNSPECIFIED CERVICAL REGION: ICD-10-CM

## 2018-04-24 DIAGNOSIS — G47.00 INSOMNIA, UNSPECIFIED: ICD-10-CM

## 2018-04-24 DIAGNOSIS — Z96.619 PRESENCE OF UNSPECIFIED ARTIFICIAL SHOULDER JOINT: ICD-10-CM

## 2018-04-24 LAB — SURGICAL PATHOLOGY FINAL REPORT - CH: SIGNIFICANT CHANGE UP

## 2018-04-26 ENCOUNTER — LABORATORY RESULT (OUTPATIENT)
Age: 63
End: 2018-04-26

## 2018-05-01 ENCOUNTER — LABORATORY RESULT (OUTPATIENT)
Age: 63
End: 2018-05-01

## 2018-05-03 ENCOUNTER — LABORATORY RESULT (OUTPATIENT)
Age: 63
End: 2018-05-03

## 2018-05-07 ENCOUNTER — APPOINTMENT (OUTPATIENT)
Dept: CARDIOLOGY | Facility: CLINIC | Age: 63
End: 2018-05-07
Payer: MEDICARE

## 2018-05-07 PROCEDURE — 99213 OFFICE O/P EST LOW 20 MIN: CPT

## 2018-05-07 PROCEDURE — 85610 PROTHROMBIN TIME: CPT | Mod: QW

## 2018-05-07 PROCEDURE — 36416 COLLJ CAPILLARY BLOOD SPEC: CPT

## 2018-05-11 ENCOUNTER — APPOINTMENT (OUTPATIENT)
Dept: CARDIOLOGY | Facility: CLINIC | Age: 63
End: 2018-05-11
Payer: MEDICARE

## 2018-05-11 DIAGNOSIS — Z47.1 AFTERCARE FOLLOWING JOINT REPLACEMENT SURGERY: ICD-10-CM

## 2018-05-11 DIAGNOSIS — Z96.651 PRESENCE OF RIGHT ARTIFICIAL KNEE JOINT: ICD-10-CM

## 2018-05-11 PROCEDURE — 99213 OFFICE O/P EST LOW 20 MIN: CPT

## 2018-05-11 PROCEDURE — 36416 COLLJ CAPILLARY BLOOD SPEC: CPT

## 2018-05-11 PROCEDURE — 85610 PROTHROMBIN TIME: CPT | Mod: QW

## 2018-05-15 ENCOUNTER — APPOINTMENT (OUTPATIENT)
Dept: CARDIOLOGY | Facility: CLINIC | Age: 63
End: 2018-05-15
Payer: MEDICARE

## 2018-05-15 DIAGNOSIS — Z79.01 ENCOUNTER FOR THERAPEUTIC DRUG LVL MONITORING: ICD-10-CM

## 2018-05-15 DIAGNOSIS — Z71.89 OTHER SPECIFIED COUNSELING: ICD-10-CM

## 2018-05-15 DIAGNOSIS — Z79.01 LONG TERM (CURRENT) USE OF ANTICOAGULANTS: ICD-10-CM

## 2018-05-15 DIAGNOSIS — Z51.81 ENCOUNTER FOR THERAPEUTIC DRUG LVL MONITORING: ICD-10-CM

## 2018-05-15 PROCEDURE — 85610 PROTHROMBIN TIME: CPT | Mod: QW

## 2018-05-15 PROCEDURE — 99213 OFFICE O/P EST LOW 20 MIN: CPT

## 2018-05-15 PROCEDURE — 36416 COLLJ CAPILLARY BLOOD SPEC: CPT

## 2018-06-09 ENCOUNTER — TRANSCRIPTION ENCOUNTER (OUTPATIENT)
Age: 63
End: 2018-06-09

## 2018-08-22 PROBLEM — M25.561 PAIN IN RIGHT KNEE: Chronic | Status: ACTIVE | Noted: 2018-04-11

## 2018-08-22 PROBLEM — I45.10 UNSPECIFIED RIGHT BUNDLE-BRANCH BLOCK: Chronic | Status: ACTIVE | Noted: 2018-04-11

## 2018-08-22 PROBLEM — M19.90 UNSPECIFIED OSTEOARTHRITIS, UNSPECIFIED SITE: Chronic | Status: ACTIVE | Noted: 2018-04-11

## 2018-08-22 PROBLEM — M54.9 DORSALGIA, UNSPECIFIED: Chronic | Status: ACTIVE | Noted: 2018-04-11

## 2018-08-22 PROBLEM — G47.00 INSOMNIA, UNSPECIFIED: Chronic | Status: ACTIVE | Noted: 2018-04-11

## 2018-09-17 ENCOUNTER — APPOINTMENT (OUTPATIENT)
Dept: BARIATRICS | Facility: CLINIC | Age: 63
End: 2018-09-17
Payer: MEDICARE

## 2018-09-17 VITALS
SYSTOLIC BLOOD PRESSURE: 175 MMHG | HEIGHT: 64 IN | BODY MASS INDEX: 40.8 KG/M2 | DIASTOLIC BLOOD PRESSURE: 85 MMHG | WEIGHT: 239 LBS | HEART RATE: 64 BPM | OXYGEN SATURATION: 98 %

## 2018-09-17 DIAGNOSIS — E78.00 PURE HYPERCHOLESTEROLEMIA, UNSPECIFIED: ICD-10-CM

## 2018-09-17 DIAGNOSIS — R63.5 ABNORMAL WEIGHT GAIN: ICD-10-CM

## 2018-09-17 DIAGNOSIS — Z90.3 POSTSURGICAL MALABSORPTION, NOT ELSEWHERE CLASSIFIED: ICD-10-CM

## 2018-09-17 DIAGNOSIS — I10 ESSENTIAL (PRIMARY) HYPERTENSION: ICD-10-CM

## 2018-09-17 DIAGNOSIS — E66.01 MORBID (SEVERE) OBESITY DUE TO EXCESS CALORIES: ICD-10-CM

## 2018-09-17 DIAGNOSIS — K91.2 POSTSURGICAL MALABSORPTION, NOT ELSEWHERE CLASSIFIED: ICD-10-CM

## 2018-09-17 PROCEDURE — 99214 OFFICE O/P EST MOD 30 MIN: CPT

## 2018-10-12 ENCOUNTER — LABORATORY RESULT (OUTPATIENT)
Age: 63
End: 2018-10-12

## 2018-10-12 LAB
ALBUMIN SERPL ELPH-MCNC: 4.4 G/DL
ALP BLD-CCNC: 88 U/L
ALT SERPL-CCNC: 10 U/L
ANION GAP SERPL CALC-SCNC: 13 MMOL/L
AST SERPL-CCNC: 20 U/L
BASOPHILS # BLD AUTO: 0.13 K/UL
BASOPHILS NFR BLD AUTO: 2 %
BILIRUB SERPL-MCNC: 0.8 MG/DL
BUN SERPL-MCNC: 12 MG/DL
CALCIUM SERPL-MCNC: 9.9 MG/DL
CHLORIDE SERPL-SCNC: 99 MMOL/L
CHOLEST SERPL-MCNC: 171 MG/DL
CHOLEST/HDLC SERPL: 2.2 RATIO
CO2 SERPL-SCNC: 29 MMOL/L
CREAT SERPL-MCNC: 0.94 MG/DL
EOSINOPHIL # BLD AUTO: 0.13 K/UL
EOSINOPHIL NFR BLD AUTO: 2 %
GLUCOSE SERPL-MCNC: 93 MG/DL
HCT VFR BLD CALC: 40.9 %
HDLC SERPL-MCNC: 79 MG/DL
HGB BLD-MCNC: 13.5 G/DL
IRON SATN MFR SERPL: 20 %
IRON SERPL-MCNC: 57 UG/DL
LDLC SERPL CALC-MCNC: 72 MG/DL
LYMPHOCYTES # BLD AUTO: 1.69 K/UL
LYMPHOCYTES NFR BLD AUTO: 27 %
MAN DIFF?: NORMAL
MCHC RBC-ENTMCNC: 31.3 PG
MCHC RBC-ENTMCNC: 33 GM/DL
MCV RBC AUTO: 94.9 FL
MONOCYTES # BLD AUTO: 0.44 K/UL
MONOCYTES NFR BLD AUTO: 7 %
NEUTROPHILS # BLD AUTO: 3.88 K/UL
NEUTROPHILS NFR BLD AUTO: 62 %
PLATELET # BLD AUTO: 209 K/UL
POTASSIUM SERPL-SCNC: 4 MMOL/L
PROT SERPL-MCNC: 7.2 G/DL
RBC # BLD: 4.31 M/UL
RBC # FLD: 14.4 %
SODIUM SERPL-SCNC: 141 MMOL/L
TIBC SERPL-MCNC: 280 UG/DL
TRIGL SERPL-MCNC: 102 MG/DL
UIBC SERPL-MCNC: 223 UG/DL
WBC # FLD AUTO: 6.25 K/UL

## 2018-10-16 LAB
25(OH)D3 SERPL-MCNC: 29.2 NG/ML
FOLATE SERPL-MCNC: 7.8 NG/ML
TSH SERPL-ACNC: 3.19 UIU/ML
VIT A SERPL-MCNC: 33 UG/DL
VIT B12 SERPL-MCNC: 464 PG/ML
ZINC SERPL-MCNC: 89 UG/DL

## 2018-10-19 ENCOUNTER — APPOINTMENT (OUTPATIENT)
Dept: BARIATRICS/WEIGHT MGMT | Facility: CLINIC | Age: 63
End: 2018-10-19

## 2018-10-19 LAB — VIT B1 SERPL-MCNC: 126.7 NMOL/L

## 2018-10-29 ENCOUNTER — APPOINTMENT (OUTPATIENT)
Dept: BARIATRICS | Facility: CLINIC | Age: 63
End: 2018-10-29

## 2019-02-09 ENCOUNTER — TRANSCRIPTION ENCOUNTER (OUTPATIENT)
Age: 64
End: 2019-02-09

## 2019-07-09 ENCOUNTER — INPATIENT (INPATIENT)
Facility: HOSPITAL | Age: 64
LOS: 0 days | Discharge: HOME | End: 2019-07-10
Attending: INTERNAL MEDICINE | Admitting: INTERNAL MEDICINE
Payer: MEDICARE

## 2019-07-09 VITALS
HEIGHT: 65 IN | HEART RATE: 80 BPM | OXYGEN SATURATION: 92 % | RESPIRATION RATE: 20 BRPM | WEIGHT: 220.02 LBS | DIASTOLIC BLOOD PRESSURE: 50 MMHG | TEMPERATURE: 98 F | SYSTOLIC BLOOD PRESSURE: 81 MMHG

## 2019-07-09 DIAGNOSIS — Z95.1 PRESENCE OF AORTOCORONARY BYPASS GRAFT: Chronic | ICD-10-CM

## 2019-07-09 DIAGNOSIS — Z98.890 OTHER SPECIFIED POSTPROCEDURAL STATES: Chronic | ICD-10-CM

## 2019-07-09 DIAGNOSIS — Z96.612 PRESENCE OF LEFT ARTIFICIAL SHOULDER JOINT: Chronic | ICD-10-CM

## 2019-07-09 DIAGNOSIS — Z98.89 OTHER SPECIFIED POSTPROCEDURAL STATES: Chronic | ICD-10-CM

## 2019-07-09 DIAGNOSIS — Z98.84 BARIATRIC SURGERY STATUS: Chronic | ICD-10-CM

## 2019-07-09 DIAGNOSIS — Z90.49 ACQUIRED ABSENCE OF OTHER SPECIFIED PARTS OF DIGESTIVE TRACT: Chronic | ICD-10-CM

## 2019-07-09 LAB
ALBUMIN SERPL ELPH-MCNC: 3.2 G/DL — LOW (ref 3.3–5)
ALP SERPL-CCNC: 72 U/L — SIGNIFICANT CHANGE UP (ref 40–120)
ALT FLD-CCNC: 13 U/L — SIGNIFICANT CHANGE UP (ref 12–78)
ANION GAP SERPL CALC-SCNC: 11 MMOL/L — SIGNIFICANT CHANGE UP (ref 5–17)
APTT BLD: 25.3 SEC — LOW (ref 27.5–36.3)
AST SERPL-CCNC: 18 U/L — SIGNIFICANT CHANGE UP (ref 15–37)
BASOPHILS # BLD AUTO: 0.02 K/UL — SIGNIFICANT CHANGE UP (ref 0–0.2)
BASOPHILS NFR BLD AUTO: 0.3 % — SIGNIFICANT CHANGE UP (ref 0–2)
BILIRUB SERPL-MCNC: 0.3 MG/DL — SIGNIFICANT CHANGE UP (ref 0.2–1.2)
BLD GP AB SCN SERPL QL: SIGNIFICANT CHANGE UP
BUN SERPL-MCNC: 14 MG/DL — SIGNIFICANT CHANGE UP (ref 7–23)
CALCIUM SERPL-MCNC: 8.3 MG/DL — LOW (ref 8.5–10.1)
CHLORIDE SERPL-SCNC: 104 MMOL/L — SIGNIFICANT CHANGE UP (ref 96–108)
CO2 SERPL-SCNC: 25 MMOL/L — SIGNIFICANT CHANGE UP (ref 22–31)
CREAT SERPL-MCNC: 1.12 MG/DL — SIGNIFICANT CHANGE UP (ref 0.5–1.3)
EOSINOPHIL # BLD AUTO: 0.1 K/UL — SIGNIFICANT CHANGE UP (ref 0–0.5)
EOSINOPHIL NFR BLD AUTO: 1.7 % — SIGNIFICANT CHANGE UP (ref 0–6)
ETHANOL SERPL-MCNC: 144 MG/DL — HIGH (ref 0–10)
GLUCOSE BLDC GLUCOMTR-MCNC: 123 MG/DL — HIGH (ref 70–99)
GLUCOSE SERPL-MCNC: 114 MG/DL — HIGH (ref 70–99)
HCT VFR BLD CALC: 34.5 % — SIGNIFICANT CHANGE UP (ref 34.5–45)
HGB BLD-MCNC: 11.3 G/DL — LOW (ref 11.5–15.5)
IMM GRANULOCYTES NFR BLD AUTO: 0.5 % — SIGNIFICANT CHANGE UP (ref 0–1.5)
INR BLD: 0.97 RATIO — SIGNIFICANT CHANGE UP (ref 0.88–1.16)
LACTATE SERPL-SCNC: 1.6 MMOL/L — SIGNIFICANT CHANGE UP (ref 0.7–2)
LACTATE SERPL-SCNC: 2.3 MMOL/L — HIGH (ref 0.7–2)
LIDOCAIN IGE QN: 103 U/L — SIGNIFICANT CHANGE UP (ref 73–393)
LYMPHOCYTES # BLD AUTO: 2.34 K/UL — SIGNIFICANT CHANGE UP (ref 1–3.3)
LYMPHOCYTES # BLD AUTO: 40.6 % — SIGNIFICANT CHANGE UP (ref 13–44)
MCHC RBC-ENTMCNC: 32.8 GM/DL — SIGNIFICANT CHANGE UP (ref 32–36)
MCHC RBC-ENTMCNC: 32.8 PG — SIGNIFICANT CHANGE UP (ref 27–34)
MCV RBC AUTO: 100.3 FL — HIGH (ref 80–100)
MONOCYTES # BLD AUTO: 0.54 K/UL — SIGNIFICANT CHANGE UP (ref 0–0.9)
MONOCYTES NFR BLD AUTO: 9.4 % — SIGNIFICANT CHANGE UP (ref 2–14)
NEUTROPHILS # BLD AUTO: 2.73 K/UL — SIGNIFICANT CHANGE UP (ref 1.8–7.4)
NEUTROPHILS NFR BLD AUTO: 47.5 % — SIGNIFICANT CHANGE UP (ref 43–77)
PLATELET # BLD AUTO: 201 K/UL — SIGNIFICANT CHANGE UP (ref 150–400)
POTASSIUM SERPL-MCNC: 3.5 MMOL/L — SIGNIFICANT CHANGE UP (ref 3.5–5.3)
POTASSIUM SERPL-SCNC: 3.5 MMOL/L — SIGNIFICANT CHANGE UP (ref 3.5–5.3)
PROT SERPL-MCNC: 6.3 GM/DL — SIGNIFICANT CHANGE UP (ref 6–8.3)
PROTHROM AB SERPL-ACNC: 10.8 SEC — SIGNIFICANT CHANGE UP (ref 10–12.9)
RBC # BLD: 3.44 M/UL — LOW (ref 3.8–5.2)
RBC # FLD: 13.4 % — SIGNIFICANT CHANGE UP (ref 10.3–14.5)
SODIUM SERPL-SCNC: 140 MMOL/L — SIGNIFICANT CHANGE UP (ref 135–145)
TROPONIN I SERPL-MCNC: <0.015 NG/ML — SIGNIFICANT CHANGE UP (ref 0.01–0.04)
TYPE + AB SCN PNL BLD: SIGNIFICANT CHANGE UP
WBC # BLD: 5.76 K/UL — SIGNIFICANT CHANGE UP (ref 3.8–10.5)
WBC # FLD AUTO: 5.76 K/UL — SIGNIFICANT CHANGE UP (ref 3.8–10.5)

## 2019-07-09 PROCEDURE — 74177 CT ABD & PELVIS W/CONTRAST: CPT | Mod: 26

## 2019-07-09 PROCEDURE — 71260 CT THORAX DX C+: CPT | Mod: 26

## 2019-07-09 PROCEDURE — 70486 CT MAXILLOFACIAL W/O DYE: CPT | Mod: 26

## 2019-07-09 PROCEDURE — 70450 CT HEAD/BRAIN W/O DYE: CPT | Mod: 26

## 2019-07-09 PROCEDURE — 99053 MED SERV 10PM-8AM 24 HR FAC: CPT

## 2019-07-09 PROCEDURE — 99285 EMERGENCY DEPT VISIT HI MDM: CPT

## 2019-07-09 PROCEDURE — 76376 3D RENDER W/INTRP POSTPROCES: CPT | Mod: 26

## 2019-07-09 PROCEDURE — 72125 CT NECK SPINE W/O DYE: CPT | Mod: 26

## 2019-07-09 PROCEDURE — 93010 ELECTROCARDIOGRAM REPORT: CPT

## 2019-07-09 RX ORDER — ATORVASTATIN CALCIUM 80 MG/1
1 TABLET, FILM COATED ORAL
Qty: 0 | Refills: 0 | DISCHARGE

## 2019-07-09 RX ORDER — KETOROLAC TROMETHAMINE 30 MG/ML
30 SYRINGE (ML) INJECTION ONCE
Refills: 0 | Status: DISCONTINUED | OUTPATIENT
Start: 2019-07-09 | End: 2019-07-09

## 2019-07-09 RX ORDER — OXYMORPHONE HYDROCHLORIDE 10 MG/1
1 TABLET, EXTENDED RELEASE ORAL
Qty: 0 | Refills: 0 | DISCHARGE

## 2019-07-09 RX ORDER — PANTOPRAZOLE SODIUM 20 MG/1
40 TABLET, DELAYED RELEASE ORAL
Refills: 0 | Status: DISCONTINUED | OUTPATIENT
Start: 2019-07-09 | End: 2019-07-10

## 2019-07-09 RX ORDER — TEMAZEPAM 15 MG/1
1 CAPSULE ORAL
Qty: 0 | Refills: 0 | DISCHARGE

## 2019-07-09 RX ORDER — TRAZODONE HCL 50 MG
1 TABLET ORAL
Qty: 0 | Refills: 0 | DISCHARGE

## 2019-07-09 RX ORDER — QUETIAPINE FUMARATE 200 MG/1
50 TABLET, FILM COATED ORAL AT BEDTIME
Refills: 0 | Status: DISCONTINUED | OUTPATIENT
Start: 2019-07-09 | End: 2019-07-10

## 2019-07-09 RX ORDER — ASPIRIN/CALCIUM CARB/MAGNESIUM 324 MG
81 TABLET ORAL DAILY
Refills: 0 | Status: DISCONTINUED | OUTPATIENT
Start: 2019-07-09 | End: 2019-07-10

## 2019-07-09 RX ORDER — SODIUM CHLORIDE 9 MG/ML
1000 INJECTION INTRAMUSCULAR; INTRAVENOUS; SUBCUTANEOUS
Refills: 0 | Status: DISCONTINUED | OUTPATIENT
Start: 2019-07-09 | End: 2019-07-10

## 2019-07-09 RX ORDER — DULOXETINE HYDROCHLORIDE 30 MG/1
30 CAPSULE, DELAYED RELEASE ORAL DAILY
Refills: 0 | Status: DISCONTINUED | OUTPATIENT
Start: 2019-07-09 | End: 2019-07-10

## 2019-07-09 RX ORDER — ONDANSETRON 8 MG/1
4 TABLET, FILM COATED ORAL EVERY 6 HOURS
Refills: 0 | Status: DISCONTINUED | OUTPATIENT
Start: 2019-07-09 | End: 2019-07-10

## 2019-07-09 RX ORDER — OXYCODONE HYDROCHLORIDE 5 MG/1
5 TABLET ORAL EVERY 6 HOURS
Refills: 0 | Status: DISCONTINUED | OUTPATIENT
Start: 2019-07-09 | End: 2019-07-10

## 2019-07-09 RX ORDER — FENOFIBRATE,MICRONIZED 130 MG
1 CAPSULE ORAL
Qty: 0 | Refills: 0 | DISCHARGE

## 2019-07-09 RX ORDER — TIZANIDINE 4 MG/1
1 TABLET ORAL
Qty: 0 | Refills: 0 | DISCHARGE

## 2019-07-09 RX ORDER — SODIUM CHLORIDE 9 MG/ML
1000 INJECTION INTRAMUSCULAR; INTRAVENOUS; SUBCUTANEOUS
Refills: 0 | Status: DISCONTINUED | OUTPATIENT
Start: 2019-07-09 | End: 2019-07-09

## 2019-07-09 RX ORDER — SODIUM CHLORIDE 9 MG/ML
1000 INJECTION INTRAMUSCULAR; INTRAVENOUS; SUBCUTANEOUS ONCE
Refills: 0 | Status: COMPLETED | OUTPATIENT
Start: 2019-07-09 | End: 2019-07-09

## 2019-07-09 RX ORDER — TRAZODONE HCL 50 MG
100 TABLET ORAL AT BEDTIME
Refills: 0 | Status: DISCONTINUED | OUTPATIENT
Start: 2019-07-09 | End: 2019-07-10

## 2019-07-09 RX ORDER — GABAPENTIN 400 MG/1
1 CAPSULE ORAL
Qty: 0 | Refills: 0 | DISCHARGE

## 2019-07-09 RX ORDER — TETANUS TOXOID, REDUCED DIPHTHERIA TOXOID AND ACELLULAR PERTUSSIS VACCINE, ADSORBED 5; 2.5; 8; 8; 2.5 [IU]/.5ML; [IU]/.5ML; UG/.5ML; UG/.5ML; UG/.5ML
0.5 SUSPENSION INTRAMUSCULAR ONCE
Refills: 0 | Status: COMPLETED | OUTPATIENT
Start: 2019-07-09 | End: 2019-07-09

## 2019-07-09 RX ORDER — RANOLAZINE 500 MG/1
500 TABLET, FILM COATED, EXTENDED RELEASE ORAL
Refills: 0 | Status: DISCONTINUED | OUTPATIENT
Start: 2019-07-09 | End: 2019-07-10

## 2019-07-09 RX ORDER — ACETAMINOPHEN 500 MG
650 TABLET ORAL EVERY 6 HOURS
Refills: 0 | Status: DISCONTINUED | OUTPATIENT
Start: 2019-07-09 | End: 2019-07-10

## 2019-07-09 RX ORDER — ATORVASTATIN CALCIUM 80 MG/1
20 TABLET, FILM COATED ORAL AT BEDTIME
Refills: 0 | Status: DISCONTINUED | OUTPATIENT
Start: 2019-07-09 | End: 2019-07-10

## 2019-07-09 RX ADMIN — TETANUS TOXOID, REDUCED DIPHTHERIA TOXOID AND ACELLULAR PERTUSSIS VACCINE, ADSORBED 0.5 MILLILITER(S): 5; 2.5; 8; 8; 2.5 SUSPENSION INTRAMUSCULAR at 04:46

## 2019-07-09 RX ADMIN — Medication 81 MILLIGRAM(S): at 18:19

## 2019-07-09 RX ADMIN — RANOLAZINE 500 MILLIGRAM(S): 500 TABLET, FILM COATED, EXTENDED RELEASE ORAL at 18:19

## 2019-07-09 RX ADMIN — Medication 30 MILLIGRAM(S): at 08:46

## 2019-07-09 RX ADMIN — OXYCODONE HYDROCHLORIDE 5 MILLIGRAM(S): 5 TABLET ORAL at 13:44

## 2019-07-09 RX ADMIN — SODIUM CHLORIDE 125 MILLILITER(S): 9 INJECTION INTRAMUSCULAR; INTRAVENOUS; SUBCUTANEOUS at 06:56

## 2019-07-09 RX ADMIN — ATORVASTATIN CALCIUM 20 MILLIGRAM(S): 80 TABLET, FILM COATED ORAL at 21:40

## 2019-07-09 RX ADMIN — Medication 100 MILLIGRAM(S): at 21:40

## 2019-07-09 RX ADMIN — SODIUM CHLORIDE 1000 MILLILITER(S): 9 INJECTION INTRAMUSCULAR; INTRAVENOUS; SUBCUTANEOUS at 05:57

## 2019-07-09 RX ADMIN — SODIUM CHLORIDE 1000 MILLILITER(S): 9 INJECTION INTRAMUSCULAR; INTRAVENOUS; SUBCUTANEOUS at 04:31

## 2019-07-09 RX ADMIN — Medication 1 TABLET(S): at 13:46

## 2019-07-09 RX ADMIN — OXYCODONE HYDROCHLORIDE 5 MILLIGRAM(S): 5 TABLET ORAL at 13:50

## 2019-07-09 RX ADMIN — QUETIAPINE FUMARATE 50 MILLIGRAM(S): 200 TABLET, FILM COATED ORAL at 21:40

## 2019-07-09 RX ADMIN — DULOXETINE HYDROCHLORIDE 30 MILLIGRAM(S): 30 CAPSULE, DELAYED RELEASE ORAL at 13:46

## 2019-07-09 NOTE — ED ADULT NURSE NOTE - NSIMPLEMENTINTERV_GEN_ALL_ED
Implemented All Fall Risk Interventions:  [place] to call system. Call bell, personal items and telephone within reach. Instruct patient to call for assistance. Room bathroom lighting operational. Non-slip footwear when patient is off stretcher. Physically safe environment: no spills, clutter or unnecessary equipment. Stretcher in lowest position, wheels locked, appropriate side rails in place. Provide visual cue, wrist band, yellow gown, etc. Monitor gait and stability. Monitor for mental status changes and reorient to person, place, and time. Review medications for side effects contributing to fall risk. Reinforce activity limits and safety measures with patient and family.

## 2019-07-09 NOTE — H&P ADULT - NSHPPHYSICALEXAM_GEN_ALL_CORE
Gen: AAOx3, NAD  HEENT: multiple lacerations, EOMI  Neck: Supple  CV: nml S1S2, RRR  Lungs: CTABL  Abd: Soft, NT, ND, BS+  Ext: No edema  Neuro: Non focal  Skin: no rashes  Psych: normal affect

## 2019-07-09 NOTE — ED PROVIDER NOTE - PROGRESS NOTE DETAILS
pt tba for syncope with hr in the 40 to 50 and bp 80 to 90mmhg. B Jero PIERCE case dw Dr. Mcdaniel, house scan negative for traumatic injury bp 94/52,, hr 46. will admit to telemetry. JUSTYNA Nogueira DO

## 2019-07-09 NOTE — ED PROVIDER NOTE - ENMT, MLM
Airway patent, Nasal mucosa clear. Mouth with normal mucosa. bridge of nose with ecchymosis, superficial abrasion and bilateral racoons eyes

## 2019-07-09 NOTE — H&P ADULT - NSICDXPASTSURGICALHX_GEN_ALL_CORE_FT
PAST SURGICAL HISTORY:  History of esophagogastroduodenoscopy (EGD)     S/P  2980,     S/P CABG x 2 2009    S/P cholecystectomy     S/P colonoscopy     S/P laparoscopic sleeve gastrectomy 2016    S/P shoulder replacement, left 2010    Status post lung surgery age 6 left upper lobectomy

## 2019-07-09 NOTE — H&P ADULT - NSICDXPASTMEDICALHX_GEN_ALL_CORE_FT
PAST MEDICAL HISTORY:  Asthma     Back pain     Hyperlipidemia     Hypertension     Insomnia     Knee pain, right     Morbid obesity     Myocardial infarction 2009    Osteoarthritis     Pain management     Right bundle branch block     Sleep apnea resolved with gastric sleeve surgery    Spinal stenosis

## 2019-07-09 NOTE — ED ADULT NURSE REASSESSMENT NOTE - NS ED NURSE REASSESS COMMENT FT1
Pt received from previous RN.  Pt aaox3, mentating well. requesting breakfast.  c/o pain to face, in no apparent distress. BPs as charted. Awaiting cardiac consult. paging Dr Sullivan per Dr Miller request.  Plan of care, transition from ED to admitted status discussed with pt.  All questions answered to pt satisfaction.   Call bell given to patient.  No additional needs at this time, will continue hourly rounding.

## 2019-07-09 NOTE — ED PROVIDER NOTE - PSYCHIATRIC, MLM
sleepy and oriented to person, place, time/situation. normal mood and affect. no apparent risk to self or others.

## 2019-07-09 NOTE — ED ADULT NURSE REASSESSMENT NOTE - NS ED NURSE REASSESS COMMENT FT1
pt resting. ambulated to bathroom with aid assistance. tolerated ambulation well.  pt with improved pain, sleeping at this time. will continue hourly rounding.

## 2019-07-09 NOTE — ED PROVIDER NOTE - CONSTITUTIONAL, MLM
normal... Well appearing, well nourished, awake, sleepy oriented to person, place, time/situation c/o headache.

## 2019-07-09 NOTE — H&P ADULT - NSHPLABSRESULTS_GEN_ALL_CORE
T(C): 36.5 (07-09-19 @ 03:27), Max: 36.5 (07-09-19 @ 03:27)  HR: 56 (07-09-19 @ 08:27) (45 - 80)  BP: 111/66 (07-09-19 @ 08:27) (81/50 - 111/66)  RR: 18 (07-09-19 @ 05:51) (18 - 20)  SpO2: 100% (07-09-19 @ 05:51) (92% - 100%)  Wt(kg): --    CARDIAC MARKERS ( 09 Jul 2019 03:38 )  <0.015 ng/mL / x     / x     / x     / x                                11.3   5.76  )-----------( 201      ( 09 Jul 2019 03:38 )             34.5     09 Jul 2019 03:38    140    |  104    |  14     ----------------------------<  114    3.5     |  25     |  1.12     Ca    8.3        09 Jul 2019 03:38    TPro  6.3    /  Alb  3.2    /  TBili  0.3    /  DBili  x      /  AST  18     /  ALT  13     /  AlkPhos  72     09 Jul 2019 03:38    PT/INR - ( 09 Jul 2019 03:38 )   PT: 10.8 sec;   INR: 0.97 ratio         PTT - ( 09 Jul 2019 03:38 )  PTT:25.3 sec  CAPILLARY BLOOD GLUCOSE      POCT Blood Glucose.: 123 mg/dL (09 Jul 2019 03:40)    LIVER FUNCTIONS - ( 09 Jul 2019 03:38 )  Alb: 3.2 g/dL / Pro: 6.3 gm/dL / ALK PHOS: 72 U/L / ALT: 13 U/L / AST: 18 U/L / GGT: x                   CT C/A/P  No evidence of acute traumatic injury involving the chest, abdomen or   pelvis.      CT Brain: No skull fracture, hemorrhage or mass effect.    CT Maxillofacial: Right forehead and supraorbital soft tissue contusion.   No facial fracture.    CT Cervical spine: No acute traumatic injury to the cervical spine.   Degenerative spondylosis.    If there are new or persistent symptoms, consider further evaluation with   brain and/or cervical spine MRI if clinically warranted and if there are   no contraindications.    Tele: NSR, 60s  EKG: junctional 50s, NSST changes

## 2019-07-09 NOTE — H&P ADULT - HISTORY OF PRESENT ILLNESS
Patient is 64yo female with PMhx of CAD s/p CABG, spinal stenosis, chronic back pain, HTN, AWAIS, obesity, presents from home for syncope. As per the patient and her son, who provided most of the history, patient earlier in the night had mechanical fall after tripping over a toy. Then was resting on the couch, when she got up to go to bathroom, she felt dizzy, and passed out, +LOC 6-7 minutes. Denies tongue bites, loss of bowel control. Of note patient had 3 vodka drinks last night, did not eat much, or drink water. No other constitutional symptoms.

## 2019-07-09 NOTE — CONSULT NOTE ADULT - SUBJECTIVE AND OBJECTIVE BOX
Patient is a 63y old  Female who presents with a chief complaint of Syncope  ________________________________  VERONICA FERNANDEZ is a 63y year old Female with a past medical history of coronary artery disease status post CABG in  following a myocardial infarction. She had a coronary angiogram in , which showed a proximal 50% LAD stenosis, with patent LIMA to LAD and saphenous vein graft to the 1st diagonal branch. She also has a history of obesity status post gastric bypass surgery,, chronic angina on Ranexa, sleep apnea and spinal stenosis.    Patient has a history of alcohol abuse patient presents after a syncopal event. From what I gather, she was drinking, and had a mechanical fall after she slipped on her and grandchild toy after which she sustained a head injury. She then went to sleep. After she awoke, she stood up and felt dizzy. She had a syncopal event lasted up to 6 minutes per her son who was present. He was unable to wake her, and EMS was called. When EMS arrived, the patient was profoundly hypotensive with systolic blood pressure into the 70s per report. I was unable to obtain the EMS run sheet. EKG performed by the EMS showed sinus bradycardia with a nonspecific intraventricular conduction delay. No ST-T wave changes consistent with ST elevation microinfarction was present. When she arrived in the ER, telemetry test for has shown sinus rhythm with PVCs. Blood pressure was initially in the 80s which has since improved.    She has been compliant with her medical therapy. She states that she is feeling much better now with IV hydration. She denies any chest pain at present. With this syncopal event she had no chest pain. She denies any shortness of breath.     Ethanol level was also elevated in the ER.    PREVIOUS CARDIAC WORKUP:    Echocardiogram: 10/18  --There is moderate left atrial dilatation (LA volume index 36 ml/m²).  --There is mild left ventricular hypertrophy.  --Global LV wall motion is normal.  --LV ejection fraction is normal.  --The left ventricular filling pattern is consistent with abnormal relaxation.  --Left ventricular ejection fraction is 55-60%.  --The aortic root is normal in size (3.2 cm). Ascending aorta is normal in size; its diameter   is 3.5 cm (1.7 cm/m²).  --Aortic valve is trileaflet.  --There is mild to moderate mitral regurgitation.  --There is mild to moderate tricuspid regurgitation.  --The right atrial pressure is 6 - 10 mm Hg. There is minimal pulmonary hypertension.    Stress Test: 2018  1. No ECG evidence of ischemia after administration of IV regadenoson.  2. Myocardial Perfusion SPECT images are abnormal , medium size, mildly severe apical lateral   wall ischemia is present.  3. Rest gated analysis showed normal left ventricular function.  4. Gated wall motion analysis shows normal wall motion.  5. Stage 2: 2: Entire lateral wall and apex are abnormal.    ________________________________  Review of systems: A 10 point review of system has been performed, and is negative except for what has been mentioned in the above history of present illness.     PAST MEDICAL & SURGICAL HISTORY:  Right bundle branch block  Back pain  Pain management  Insomnia  Knee pain, right  Osteoarthritis  Morbid obesity  Asthma  Spinal stenosis  Sleep apnea: resolved with gastric sleeve surgery  Hyperlipidemia  Hypertension  Myocardial infarction:   History of esophagogastroduodenoscopy (EGD)  S/P colonoscopy  S/P laparoscopic sleeve gastrectomy:   S/P : 2980, 1982  Status post lung surgery: age 6 left upper lobectomy  S/P cholecystectomy:   S/P shoulder replacement, left:   S/P CABG x 2: 2009    FAMILY HISTORY:  Family history of stroke (Mother)  Family history of heart disease (Father, Mother)     The patient denies any history of premature CAD or sudden cardiac death.    SOCIAL HISTORY: The patient denies any history of tobacco abuse, alcohol abuse or illicit drug use.     Home Medications:  aspirin 81 mg oral tablet: 1 tab(s) orally once a day (2019 09:30)  diclofenac 1.3% topical film, extended release: Apply topically to affected area 2 times a day (2019 09:30)  docusate sodium 100 mg oral capsule: 1 cap(s) orally once a day (in the morning) (2019 09:30)  DULoxetine 30 mg oral delayed release capsule: 1 cap(s) orally once a day (2019 09:30)  hydrocodone-acetaminophen 7.5 mg-325 mg oral tablet: 1 tab(s) orally 2 times a day, As Needed - for moderate pain. MDD2 (2019 09:30)  metoprolol tartrate 25 mg oral tablet: 1 tab(s) orally 2 times a day (:30)  Ranexa 500 mg oral tablet, extended release: 1 tab(s) orally 2 times a day (2019 09:30)  SEROquel 50 mg oral tablet: 1 tab(s) orally once a day (at bedtime), As Needed - for insomnia (2019 09:30)  tiZANidine 4 mg oral tablet: 1 tab(s) orally 3 times a day (2019 09:30)  traZODone 100 mg oral tablet: 1 tab(s) orally once a day (at bedtime) (2019 09:30)    MEDICATIONS  (STANDING):  atorvastatin 20 milliGRAM(s) Oral at bedtime  DULoxetine 30 milliGRAM(s) Oral daily  multivitamin 1 Tablet(s) Oral daily  pantoprazole    Tablet 40 milliGRAM(s) Oral before breakfast  QUEtiapine 50 milliGRAM(s) Oral at bedtime  ranolazine 500 milliGRAM(s) Oral two times a day  sodium chloride 0.9%. 1000 milliLiter(s) (100 mL/Hr) IV Continuous <Continuous>  traZODone 100 milliGRAM(s) Oral at bedtime    MEDICATIONS  (PRN):  acetaminophen   Tablet .. 650 milliGRAM(s) Oral every 6 hours PRN Mild Pain (1 - 3)  ondansetron Injectable 4 milliGRAM(s) IV Push every 6 hours PRN Nausea  oxyCODONE    IR 5 milliGRAM(s) Oral every 6 hours PRN Severe Pain (7 - 10)    Vital Signs Last 24 Hrs  T(C): 36.7 (2019 11:30), Max: 36.7 (2019 11:30)  T(F): 98.1 (2019 11:30), Max: 98.1 (2019 11:30)  HR: 49 (2019 11:30) (45 - 80)  BP: 130/65 (2019 11:30) (81/50 - 130/65)  BP(mean): --  RR: 16 (2019 11:30) (16 - 20)  SpO2: 100% (2019 11:30) (92% - 100%)  I&O's Summary    ________________________________     PHYSICAL EXAM:  GENERAL APPEARANCE:  No acute distress  HEAD: Head trauma with ecchymosis noted  NECK: supple, no jugular venous distention, no carotid bruit    HEART: regular rate and rhythm, S1, S2 normal, distant heart sound 1/6 systolic murmur    CHEST:  No anterior chest wall tenderness    LUNGS:  Clear to auscultation, without any wheezing, rhonchi or rales    ABDOMEN soft, nontender, nondistended, with positive bowel sounds appreciated  EXTREMITIES: no clubbing, cyanosis, or edema.   NEURO:  Alert and oriented x3  PSYC:  Normal affect  SKIN:  Dry  _______________ _____________  TELEMETRY: Sinus bradycardia with PVCs    ECG: EMS EKG showed sinus rhythm with nonspecific IVCD. EKG performed here showed junctional bradycardia and repeat EKG shows sinus rhythm with nonspecific intraventricular conduction delay and nonspecific changes.    LABS:                        11.3   5.76  )-----------( 201      ( 2019 03:38 )             34.5             07-09    140  |  104  |  14  ----------------------------<  114<H>  3.5   |  25  |  1.12    Ca    8.3<L>      2019 03:38    TPro  6.3  /  Alb  3.2<L>  /  TBili  0.3  /  DBili  x   /  AST  18  /  ALT  13  /  AlkPhos  72  07-09      LIVER FUNCTIONS - ( 2019 03:38 )  Alb: 3.2 g/dL / Pro: 6.3 gm/dL / ALK PHOS: 72 U/L / ALT: 13 U/L / AST: 18 U/L / GGT: x         PT/INR - ( 2019 03:38 )   PT: 10.8 sec;   INR: 0.97 ratio         PTT - ( 2019 03:38 )  PTT:25.3 sec    - @ 03:38  Trop-I  <0.015  CK      --  CK-MB   --      PT/INR - ( 2019 03:38 )   PT: 10.8 sec;   INR: 0.97 ratio         PTT - ( 2019 03:38 )  PTT:25.3 sec      ________________________________    RADIOLOGY & ADDITIONAL STUDIES: No external hemorrhage  ________________________________    ASSESSMENT:  Syncope, and is setting of low blood pressure and alcohol intoxication  Hypotension, improving  Coronary artery disease status post CABG  Hypertension  Hyperlipidemia  Chronic stable angina on Ranexa    PLAN:  This patient presents after a syncopal event, in the setting of low blood pressure due to alcohol intoxication and administration of opioids for pain.  Hypertension has improved beta blocker.   Hold beta blocker. Hold hydrochlorothiazide.  Continue with ranexa.  Continue with antiplatelet therapy.  Continue with statin.  Check TSH  If BP remains low - check cortisol  No need for cardiac testing at this time.    Discussed with son, patient and primary care physician.      __________________________________________________________________________  Thank you for allowing me to participate in the care of your patient. Please contact me should any questions arise.    RAZA Sullivan DO, [facility]  [phone]

## 2019-07-09 NOTE — ED PROVIDER NOTE - CARE PLAN
Principal Discharge DX:	Syncope, unspecified syncope type  Secondary Diagnosis:	Bradycardia with 41-50 beats per minute  Secondary Diagnosis:	Facial contusion, initial encounter

## 2019-07-09 NOTE — ED PROVIDER NOTE - OBJECTIVE STATEMENT
64 yo female biba from home s/p witnessed fall outside, mechanical as per son then syncopized.  EMS gave atropine 1.5mg and ns 250ml pta for hr in the 40's and sbp 70mmhg.  Pt states she takes metoprolol and daily aspirin. Son also states he gave her an extra pain medication tonight after she fell.

## 2019-07-09 NOTE — H&P ADULT - NSICDXFAMILYHX_GEN_ALL_CORE_FT
FAMILY HISTORY:  Father  Still living? No  Family history of heart disease, Age at diagnosis: Age Unknown    Mother  Still living? No  Family history of heart disease, Age at diagnosis: Age Unknown  Family history of stroke, Age at diagnosis: Age Unknown

## 2019-07-09 NOTE — H&P ADULT - ASSESSMENT
62yo female a/w syncope        SYNCOPE  CAD S/P CABG  HTN  OBESITY  SPINAL STENOSIS  ETOH USE      - currently afebrile, HD stable in NSR, 60s, in NAD, doing well, AAOx3, non focal neuro exam  - labs, imaging, chart reviewed  - syncope likely combination of ETOH, dehydration, with concomitant BB (Lopressor)    PLAN  HOLD BB  IVF hydration  CIWA protocol  Thiamine  Folic Acid  MVT  Ranexa 500mg PO BID  Pain control, IR Oxycodone  Check Thyroid panel  Seroquel Qhs (home dose)  Trazadone Qhs  DVT ppx, HSQ  Admit, tele

## 2019-07-09 NOTE — H&P ADULT - NSHPROSALLOTHERNEGRD_GEN_ALL_CORE
----- Message from Vanesa Taylor sent at 2/13/2017  7:50 AM CST -----  Contact: Dr. Hollis's office  Calling to check status of patient clearance for surgery. Please adv/call [phone].//thanks. cw  
Called and notified office that we never received any forms to fill out for pt for surgery. She will send them over now to us.   
All other review of systems negative, except as noted in HPI

## 2019-07-09 NOTE — ED ADULT TRIAGE NOTE - CHIEF COMPLAINT QUOTE
s/p syncopal episode. as per pts son pt had mechanical trip and fall last night, hit her forehead. pt took percocet for pain and went to sleep. about 45min ago pt had witnessed syncopal episode by son, +LOC, takes aspirin daily. Pt was bradycardic in 40s for EMS, received 1.5mg atropine. pt hypotensive on arrival. pt alert, oriented, x4 in triage. t alert called.

## 2019-07-09 NOTE — H&P ADULT - NSHPREVIEWOFSYSTEMS_GEN_ALL_CORE
(-)Fever, chills, cough, chest pain, sob, headache, dizziness, palpitations, abd pain, n/v/d, leg swelling  (+)SYNCOPE

## 2019-07-10 ENCOUNTER — TRANSCRIPTION ENCOUNTER (OUTPATIENT)
Age: 64
End: 2019-07-10

## 2019-07-10 VITALS
OXYGEN SATURATION: 96 % | SYSTOLIC BLOOD PRESSURE: 134 MMHG | DIASTOLIC BLOOD PRESSURE: 57 MMHG | TEMPERATURE: 98 F | HEART RATE: 66 BPM | RESPIRATION RATE: 17 BRPM

## 2019-07-10 LAB
ALBUMIN SERPL ELPH-MCNC: 2.9 G/DL — LOW (ref 3.3–5)
ALP SERPL-CCNC: 71 U/L — SIGNIFICANT CHANGE UP (ref 40–120)
ALT FLD-CCNC: 14 U/L — SIGNIFICANT CHANGE UP (ref 12–78)
ANION GAP SERPL CALC-SCNC: 4 MMOL/L — LOW (ref 5–17)
AST SERPL-CCNC: 17 U/L — SIGNIFICANT CHANGE UP (ref 15–37)
BASOPHILS # BLD AUTO: 0.03 K/UL — SIGNIFICANT CHANGE UP (ref 0–0.2)
BASOPHILS NFR BLD AUTO: 0.5 % — SIGNIFICANT CHANGE UP (ref 0–2)
BILIRUB SERPL-MCNC: 0.9 MG/DL — SIGNIFICANT CHANGE UP (ref 0.2–1.2)
BUN SERPL-MCNC: 16 MG/DL — SIGNIFICANT CHANGE UP (ref 7–23)
CALCIUM SERPL-MCNC: 8.2 MG/DL — LOW (ref 8.5–10.1)
CHLORIDE SERPL-SCNC: 109 MMOL/L — HIGH (ref 96–108)
CO2 SERPL-SCNC: 28 MMOL/L — SIGNIFICANT CHANGE UP (ref 22–31)
CREAT SERPL-MCNC: 0.96 MG/DL — SIGNIFICANT CHANGE UP (ref 0.5–1.3)
EOSINOPHIL # BLD AUTO: 0.16 K/UL — SIGNIFICANT CHANGE UP (ref 0–0.5)
EOSINOPHIL NFR BLD AUTO: 2.8 % — SIGNIFICANT CHANGE UP (ref 0–6)
GLUCOSE SERPL-MCNC: 81 MG/DL — SIGNIFICANT CHANGE UP (ref 70–99)
HCT VFR BLD CALC: 32.7 % — LOW (ref 34.5–45)
HCV AB S/CO SERPL IA: 0.07 S/CO — SIGNIFICANT CHANGE UP (ref 0–0.99)
HCV AB SERPL-IMP: SIGNIFICANT CHANGE UP
HGB BLD-MCNC: 10.8 G/DL — LOW (ref 11.5–15.5)
IMM GRANULOCYTES NFR BLD AUTO: 0.4 % — SIGNIFICANT CHANGE UP (ref 0–1.5)
LYMPHOCYTES # BLD AUTO: 1.65 K/UL — SIGNIFICANT CHANGE UP (ref 1–3.3)
LYMPHOCYTES # BLD AUTO: 28.9 % — SIGNIFICANT CHANGE UP (ref 13–44)
MAGNESIUM SERPL-MCNC: 1.7 MG/DL — SIGNIFICANT CHANGE UP (ref 1.6–2.6)
MCHC RBC-ENTMCNC: 32.8 PG — SIGNIFICANT CHANGE UP (ref 27–34)
MCHC RBC-ENTMCNC: 33 GM/DL — SIGNIFICANT CHANGE UP (ref 32–36)
MCV RBC AUTO: 99.4 FL — SIGNIFICANT CHANGE UP (ref 80–100)
MONOCYTES # BLD AUTO: 0.46 K/UL — SIGNIFICANT CHANGE UP (ref 0–0.9)
MONOCYTES NFR BLD AUTO: 8.1 % — SIGNIFICANT CHANGE UP (ref 2–14)
NEUTROPHILS # BLD AUTO: 3.38 K/UL — SIGNIFICANT CHANGE UP (ref 1.8–7.4)
NEUTROPHILS NFR BLD AUTO: 59.3 % — SIGNIFICANT CHANGE UP (ref 43–77)
PHOSPHATE SERPL-MCNC: 2.8 MG/DL — SIGNIFICANT CHANGE UP (ref 2.5–4.5)
PLATELET # BLD AUTO: 191 K/UL — SIGNIFICANT CHANGE UP (ref 150–400)
POTASSIUM SERPL-MCNC: 3.8 MMOL/L — SIGNIFICANT CHANGE UP (ref 3.5–5.3)
POTASSIUM SERPL-SCNC: 3.8 MMOL/L — SIGNIFICANT CHANGE UP (ref 3.5–5.3)
PROT SERPL-MCNC: 5.8 GM/DL — LOW (ref 6–8.3)
RBC # BLD: 3.29 M/UL — LOW (ref 3.8–5.2)
RBC # FLD: 13.5 % — SIGNIFICANT CHANGE UP (ref 10.3–14.5)
SODIUM SERPL-SCNC: 141 MMOL/L — SIGNIFICANT CHANGE UP (ref 135–145)
T3 SERPL-MCNC: 68 NG/DL — LOW (ref 80–200)
T4 AB SER-ACNC: 4.2 UG/DL — LOW (ref 4.6–12)
TSH SERPL-MCNC: 2.52 UU/ML — SIGNIFICANT CHANGE UP (ref 0.34–4.82)
WBC # BLD: 5.7 K/UL — SIGNIFICANT CHANGE UP (ref 3.8–10.5)
WBC # FLD AUTO: 5.7 K/UL — SIGNIFICANT CHANGE UP (ref 3.8–10.5)

## 2019-07-10 PROCEDURE — 73560 X-RAY EXAM OF KNEE 1 OR 2: CPT | Mod: 26,LT

## 2019-07-10 PROCEDURE — 73590 X-RAY EXAM OF LOWER LEG: CPT | Mod: 26,LT

## 2019-07-10 RX ORDER — METOPROLOL TARTRATE 50 MG
25 TABLET ORAL
Refills: 0 | Status: DISCONTINUED | OUTPATIENT
Start: 2019-07-10 | End: 2019-07-10

## 2019-07-10 RX ORDER — MAGNESIUM OXIDE 400 MG ORAL TABLET 241.3 MG
400 TABLET ORAL ONCE
Refills: 0 | Status: COMPLETED | OUTPATIENT
Start: 2019-07-10 | End: 2019-07-10

## 2019-07-10 RX ADMIN — Medication 81 MILLIGRAM(S): at 10:57

## 2019-07-10 RX ADMIN — RANOLAZINE 500 MILLIGRAM(S): 500 TABLET, FILM COATED, EXTENDED RELEASE ORAL at 06:15

## 2019-07-10 RX ADMIN — OXYCODONE HYDROCHLORIDE 5 MILLIGRAM(S): 5 TABLET ORAL at 03:33

## 2019-07-10 RX ADMIN — OXYCODONE HYDROCHLORIDE 5 MILLIGRAM(S): 5 TABLET ORAL at 10:52

## 2019-07-10 RX ADMIN — OXYCODONE HYDROCHLORIDE 5 MILLIGRAM(S): 5 TABLET ORAL at 11:02

## 2019-07-10 RX ADMIN — PANTOPRAZOLE SODIUM 40 MILLIGRAM(S): 20 TABLET, DELAYED RELEASE ORAL at 06:15

## 2019-07-10 RX ADMIN — DULOXETINE HYDROCHLORIDE 30 MILLIGRAM(S): 30 CAPSULE, DELAYED RELEASE ORAL at 10:57

## 2019-07-10 RX ADMIN — OXYCODONE HYDROCHLORIDE 5 MILLIGRAM(S): 5 TABLET ORAL at 10:58

## 2019-07-10 RX ADMIN — Medication 1 TABLET(S): at 10:59

## 2019-07-10 RX ADMIN — MAGNESIUM OXIDE 400 MG ORAL TABLET 400 MILLIGRAM(S): 241.3 TABLET ORAL at 13:06

## 2019-07-10 NOTE — DISCHARGE NOTE PROVIDER - NSDCCPCAREPLAN_GEN_ALL_CORE_FT
PRINCIPAL DISCHARGE DIAGNOSIS  Diagnosis: Facial contusion, initial encounter  Assessment and Plan of Treatment: continue with ice pack to face; outpatient f/u with primary care doctor

## 2019-07-10 NOTE — PHYSICAL THERAPY INITIAL EVALUATION ADULT - PERTINENT HX OF CURRENT PROBLEM, REHAB EVAL
Pt is a 62 y/o F, presented to  from home for syncope  as per chart- patient earlier in the night had mechanical fall after tripping over a toy. Then was resting on the couch, when she got up to go to bathroom, she felt dizzy, and passed out, +LOC 6-7 minutes. CT + for suborbital soft tissue contusion only..

## 2019-07-10 NOTE — PHYSICAL THERAPY INITIAL EVALUATION ADULT - GENERAL OBSERVATIONS, REHAB EVAL
Pt was seated in a chair on 3 N, awaiting to be discharged home. Family at bedside. NAD, C/o 3/10 L calf pain and will follow up w/ pain management Dr at home.

## 2019-07-10 NOTE — DISCHARGE NOTE NURSING/CASE MANAGEMENT/SOCIAL WORK - NSDCDPATPORTLINK_GEN_ALL_CORE
You can access the [facility], offered by [facility], by registering with the following website: [email or web address]

## 2019-07-10 NOTE — DISCHARGE NOTE PROVIDER - CARE PROVIDER_API CALL
eDepthi Sullivan (DO; [email or web address])  Cardiology  [address] [place] [address]  Phone: [phone]  Fax: [phone]  Follow Up Time:

## 2019-07-10 NOTE — PHYSICAL THERAPY INITIAL EVALUATION ADULT - LEVEL OF INDEPENDENCE: STAIR NEGOTIATION, REHAB EVAL
Pt refused to perform stairs at this time. Instructed in "good leg" to ascend and "L leg to descend. Pt states she has a handrail and doesn't need to practice

## 2019-07-10 NOTE — PHYSICAL THERAPY INITIAL EVALUATION ADULT - GAIT DISTANCE, PT EVAL
No LOB noted, antalgic gait due to c/o L calf pain. Recommended use of cane at home. pt states she has a cane/75 feet

## 2019-07-10 NOTE — PROGRESS NOTE ADULT - SUBJECTIVE AND OBJECTIVE BOX
Initial Consult HPI  VERONICA FERNANDEZ is a 63y year old Female with a past medical history of coronary artery disease status post CABG in  following a myocardial infarction. She had a coronary angiogram in , which showed a proximal 50% LAD stenosis, with patent LIMA to LAD and saphenous vein graft to the 1st diagonal branch. She also has a history of obesity status post gastric bypass surgery,, chronic angina on Ranexa, sleep apnea and spinal stenosis.  Admitted for hypotension and Bradycardia s/p fall.    The patient was seen and examined. No acute events overnight.  No chest pain.  No shortness of breath.  No palpitations.  Feeling better but PVC on tele    PREVIOUS CARDIAC WORKUP:    Echocardiogram: 10/18  --There is moderate left atrial dilatation (LA volume index 36 ml/m²).  --There is mild left ventricular hypertrophy.  --Global LV wall motion is normal.  --LV ejection fraction is normal.  --The left ventricular filling pattern is consistent with abnormal relaxation.  --Left ventricular ejection fraction is 55-60%.  --The aortic root is normal in size (3.2 cm). Ascending aorta is normal in size; its diameter   is 3.5 cm (1.7 cm/m²).  --Aortic valve is trileaflet.  --There is mild to moderate mitral regurgitation.  --There is mild to moderate tricuspid regurgitation.  --The right atrial pressure is 6 - 10 mm Hg. There is minimal pulmonary hypertension.    Stress Test:   1. No ECG evidence of ischemia after administration of IV regadenoson.  2. Myocardial Perfusion SPECT images are abnormal , medium size, mildly severe apical lateral   wall ischemia is present.  3. Rest gated analysis showed normal left ventricular function.  4. Gated wall motion analysis shows normal wall motion.  5. Stage 2: 2: Entire lateral wall and apex are abnormal.    ________________________________  Review of systems: A 10 point review of system has been performed, and is negative except for what has been mentioned in the above history of present illness.     PAST MEDICAL & SURGICAL HISTORY:  Right bundle branch block  Back pain  Pain management  Insomnia  Knee pain, right  Osteoarthritis  Morbid obesity  Asthma  Spinal stenosis  Sleep apnea: resolved with gastric sleeve surgery  Hyperlipidemia  Hypertension  Myocardial infarction:   History of esophagogastroduodenoscopy (EGD)  S/P colonoscopy  S/P laparoscopic sleeve gastrectomy:   S/P : 2980, 1982  Status post lung surgery: age 6 left upper lobectomy  S/P cholecystectomy:   S/P shoulder replacement, left:   S/P CABG x 2:     FAMILY HISTORY:  Family history of stroke (Mother)  Family history of heart disease (Father, Mother)     The patient denies any history of premature CAD or sudden cardiac death.    SOCIAL HISTORY: The patient denies any history of tobacco abuse, alcohol abuse or illicit drug use.     Home Medications:  aspirin 81 mg oral tablet: 1 tab(s) orally once a day (2019 09:30)  diclofenac 1.3% topical film, extended release: Apply topically to affected area 2 times a day (2019 09:30)  docusate sodium 100 mg oral capsule: 1 cap(s) orally once a day (in the morning) (2019 09:30)  DULoxetine 30 mg oral delayed release capsule: 1 cap(s) orally once a day (2019 09:30)  hydrocodone-acetaminophen 7.5 mg-325 mg oral tablet: 1 tab(s) orally 2 times a day, As Needed - for moderate pain. MDD2 (2019 09:30)  metoprolol tartrate 25 mg oral tablet: 1 tab(s) orally 2 times a day (2019 09:30)  Ranexa 500 mg oral tablet, extended release: 1 tab(s) orally 2 times a day (2019 09:30)  SEROquel 50 mg oral tablet: 1 tab(s) orally once a day (at bedtime), As Needed - for insomnia (2019 09:30)  tiZANidine 4 mg oral tablet: 1 tab(s) orally 3 times a day (2019 09:30)  traZODone 100 mg oral tablet: 1 tab(s) orally once a day (at bedtime) (2019 09:30)    MEDICATIONS  (STANDING):  aspirin enteric coated 81 milliGRAM(s) Oral daily  atorvastatin 20 milliGRAM(s) Oral at bedtime  DULoxetine 30 milliGRAM(s) Oral daily  multivitamin 1 Tablet(s) Oral daily  pantoprazole    Tablet 40 milliGRAM(s) Oral before breakfast  QUEtiapine 50 milliGRAM(s) Oral at bedtime  ranolazine 500 milliGRAM(s) Oral two times a day  sodium chloride 0.9%. 1000 milliLiter(s) (100 mL/Hr) IV Continuous <Continuous>  traZODone 100 milliGRAM(s) Oral at bedtime    MEDICATIONS  (PRN):  acetaminophen   Tablet .. 650 milliGRAM(s) Oral every 6 hours PRN Mild Pain (1 - 3)  ondansetron Injectable 4 milliGRAM(s) IV Push every 6 hours PRN Nausea  oxyCODONE    IR 5 milliGRAM(s) Oral every 6 hours PRN Severe Pain (7 - 10)      Vital Signs Last 24 Hrs  T(C): 36.8 (10 Jul 2019 10:46), Max: 36.8 (10 Jul 2019 04:54)  T(F): 98.2 (10 Jul 2019 10:46), Max: 98.3 (10 Jul 2019 04:54)  HR: 66 (10 Jul 2019 10:46) (49 - 69)  BP: 134/57 (10 Jul 2019 10:46) (130/65 - 142/70)  BP(mean): --  RR: 17 (10 Jul 2019 10:46) (16 - 17)  SpO2: 96% (10 Jul 2019 10:46) (93% - 100%)  I&O's Summary    2019 07:01  -  10 Jul 2019 07:00  --------------------------------------------------------  IN: 1100 mL / OUT: 0 mL / NET: 1100 mL      ________________________________     PHYSICAL EXAM:  GENERAL APPEARANCE:  No acute distress  HEAD: Head trauma with ecchymosis noted  NECK: supple, no jugular venous distention, no carotid bruit    HEART: regular rate and rhythm, S1, S2 normal, distant heart sound 1/6 systolic murmur    CHEST:  No anterior chest wall tenderness    LUNGS:  Clear to auscultation, without any wheezing, rhonchi or rales    ABDOMEN soft, nontender, nondistended, with positive bowel sounds appreciated  EXTREMITIES: no clubbing, cyanosis, or edema.   NEURO:  Alert and oriented x3  PSYC:  Normal affect  SKIN:  Dry  _______________ _____________  TELEMETRY: Sinus Rhythm with PVCs    ECG: EMS EKG showed sinus rhythm with nonspecific IVCD. EKG performed here showed junctional bradycardia and repeat EKG shows sinus rhythm with nonspecific intraventricular conduction delay and nonspecific changes.    LABS:                                   10.8   5.70  )-----------( 191      ( 10 Jul 2019 07:18 )             32.7          07-10    141  |  109<H>  |  16  ----------------------------<  81  3.8   |  28  |  0.96    Ca    8.2<L>      10 Jul 2019 07:18  Phos  2.8     07-10  Mg     1.7     07-10    TPro  5.8<L>  /  Alb  2.9<L>  /  TBili  0.9  /  DBili  x   /  AST  17  /  ALT  14  /  AlkPhos  71  07-10    CARDIAC MARKERS ( 2019 03:38 )  <0.015 ng/mL / x     / x     / x     / x          PT/INR - ( 2019 03:38 )   PT: 10.8 sec;   INR: 0.97 ratio         PTT - ( 2019 03:38 )  PTT:25.3 sec               ________________________________    RADIOLOGY & ADDITIONAL STUDIES: No ICH  ________________________________    ASSESSMENT:  Syncope, and is setting of low blood pressure and alcohol intoxication  Hypotension, improving  Coronary artery disease status post CABG  Hypertension  Hyperlipidemia  Chronic stable angina on Ranexa  PVCs on tele - frequent    PLAN:  This patient presents after a syncopal event, in the setting of low blood pressure due to alcohol intoxication and administration of opioids for pain.  The patient is having frequent PVCs on telemetry.  Her previous stress test was in 2018, no need for repeat at this time.  Blood pressure improved. Heart rate improved. I will resume her beta blocker.  Hold off on resuming hydrochlorothiazide.  Continue with ranexa.  Continue with antiplatelet therapy.  Continue with statin.  TSH WNL    Will Sign off- FU w/ Dr Miller at our office    __________________________________________________________________________  Thank you for allowing me to participate in the care of your patient. Please contact me should any questions arise.    RAZA Sullivan DO, [facility]  [phone]

## 2019-07-10 NOTE — PHYSICAL THERAPY INITIAL EVALUATION ADULT - DIAGNOSIS, PT EVAL
syncope, low blood pressure, ETOH USE as per chart syncope, low blood pressure, ETOH USE as per chart, L LE pain, X-Ray negative
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
## 2019-07-10 NOTE — PHYSICAL THERAPY INITIAL EVALUATION ADULT - DISCHARGE DISPOSITION, PT EVAL
Pt has L calf pain, X-rays neg for fracture, Pt will follow with out pt MD. Recommended SC for use during healing. Pt states she has a SC at home.

## 2019-07-15 DIAGNOSIS — R55 SYNCOPE AND COLLAPSE: ICD-10-CM

## 2019-07-15 DIAGNOSIS — S00.83XA CONTUSION OF OTHER PART OF HEAD, INITIAL ENCOUNTER: ICD-10-CM

## 2019-07-15 DIAGNOSIS — I10 ESSENTIAL (PRIMARY) HYPERTENSION: ICD-10-CM

## 2019-07-15 DIAGNOSIS — Z95.1 PRESENCE OF AORTOCORONARY BYPASS GRAFT: ICD-10-CM

## 2019-07-15 DIAGNOSIS — Z98.84 BARIATRIC SURGERY STATUS: ICD-10-CM

## 2019-07-15 DIAGNOSIS — I25.2 OLD MYOCARDIAL INFARCTION: ICD-10-CM

## 2019-07-15 DIAGNOSIS — E66.01 MORBID (SEVERE) OBESITY DUE TO EXCESS CALORIES: ICD-10-CM

## 2019-07-15 DIAGNOSIS — Z79.82 LONG TERM (CURRENT) USE OF ASPIRIN: ICD-10-CM

## 2019-07-15 DIAGNOSIS — M19.90 UNSPECIFIED OSTEOARTHRITIS, UNSPECIFIED SITE: ICD-10-CM

## 2019-07-15 DIAGNOSIS — F10.10 ALCOHOL ABUSE, UNCOMPLICATED: ICD-10-CM

## 2019-07-15 DIAGNOSIS — I25.10 ATHEROSCLEROTIC HEART DISEASE OF NATIVE CORONARY ARTERY WITHOUT ANGINA PECTORIS: ICD-10-CM

## 2019-07-15 DIAGNOSIS — E78.5 HYPERLIPIDEMIA, UNSPECIFIED: ICD-10-CM

## 2019-07-15 DIAGNOSIS — R00.1 BRADYCARDIA, UNSPECIFIED: ICD-10-CM

## 2019-07-15 DIAGNOSIS — Z79.01 LONG TERM (CURRENT) USE OF ANTICOAGULANTS: ICD-10-CM
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
## 2019-07-15 DIAGNOSIS — I95.9 HYPOTENSION, UNSPECIFIED: ICD-10-CM

## 2019-07-15 DIAGNOSIS — E86.0 DEHYDRATION: ICD-10-CM
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
## 2019-07-15 DIAGNOSIS — J45.909 UNSPECIFIED ASTHMA, UNCOMPLICATED: ICD-10-CM

## 2019-10-07 ENCOUNTER — TRANSCRIPTION ENCOUNTER (OUTPATIENT)
Age: 64
End: 2019-10-07
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
## 2021-10-11 ENCOUNTER — EMERGENCY (EMERGENCY)
Facility: HOSPITAL | Age: 66
LOS: 0 days | Discharge: HOME | End: 2021-10-11
Attending: EMERGENCY MEDICINE
Payer: COMMERCIAL

## 2021-10-11 ENCOUNTER — TRANSCRIPTION ENCOUNTER (OUTPATIENT)
Age: 66
End: 2021-10-11

## 2021-10-11 VITALS — SYSTOLIC BLOOD PRESSURE: 131 MMHG | DIASTOLIC BLOOD PRESSURE: 50 MMHG | HEART RATE: 46 BPM

## 2021-10-11 VITALS — HEIGHT: 65 IN | WEIGHT: 250 LBS

## 2021-10-11 DIAGNOSIS — S05.11XA CONTUSION OF EYEBALL AND ORBITAL TISSUES, RIGHT EYE, INITIAL ENCOUNTER: ICD-10-CM

## 2021-10-11 DIAGNOSIS — Z90.49 ACQUIRED ABSENCE OF OTHER SPECIFIED PARTS OF DIGESTIVE TRACT: Chronic | ICD-10-CM
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
## 2021-10-11 DIAGNOSIS — Z95.1 PRESENCE OF AORTOCORONARY BYPASS GRAFT: Chronic | ICD-10-CM
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
## 2021-10-11 DIAGNOSIS — Z98.890 OTHER SPECIFIED POSTPROCEDURAL STATES: Chronic | ICD-10-CM

## 2021-10-11 DIAGNOSIS — Z98.84 BARIATRIC SURGERY STATUS: Chronic | ICD-10-CM

## 2021-10-11 DIAGNOSIS — Z90.49 ACQUIRED ABSENCE OF OTHER SPECIFIED PARTS OF DIGESTIVE TRACT: ICD-10-CM

## 2021-10-11 DIAGNOSIS — I25.2 OLD MYOCARDIAL INFARCTION: ICD-10-CM

## 2021-10-11 DIAGNOSIS — E66.01 MORBID (SEVERE) OBESITY DUE TO EXCESS CALORIES: ICD-10-CM

## 2021-10-11 DIAGNOSIS — Z95.1 PRESENCE OF AORTOCORONARY BYPASS GRAFT: ICD-10-CM

## 2021-10-11 DIAGNOSIS — R42 DIZZINESS AND GIDDINESS: ICD-10-CM

## 2021-10-11 DIAGNOSIS — H57.89 OTHER SPECIFIED DISORDERS OF EYE AND ADNEXA: ICD-10-CM

## 2021-10-11 DIAGNOSIS — Z98.89 OTHER SPECIFIED POSTPROCEDURAL STATES: Chronic | ICD-10-CM

## 2021-10-11 DIAGNOSIS — G47.00 INSOMNIA, UNSPECIFIED: ICD-10-CM

## 2021-10-11 DIAGNOSIS — I10 ESSENTIAL (PRIMARY) HYPERTENSION: ICD-10-CM

## 2021-10-11 DIAGNOSIS — E78.5 HYPERLIPIDEMIA, UNSPECIFIED: ICD-10-CM

## 2021-10-11 DIAGNOSIS — Z98.890 OTHER SPECIFIED POSTPROCEDURAL STATES: ICD-10-CM

## 2021-10-11 DIAGNOSIS — Z98.84 BARIATRIC SURGERY STATUS: ICD-10-CM

## 2021-10-11 DIAGNOSIS — Z96.612 PRESENCE OF LEFT ARTIFICIAL SHOULDER JOINT: ICD-10-CM

## 2021-10-11 DIAGNOSIS — M48.00 SPINAL STENOSIS, SITE UNSPECIFIED: ICD-10-CM

## 2021-10-11 DIAGNOSIS — Z96.612 PRESENCE OF LEFT ARTIFICIAL SHOULDER JOINT: Chronic | ICD-10-CM

## 2021-10-11 DIAGNOSIS — S09.90XA UNSPECIFIED INJURY OF HEAD, INITIAL ENCOUNTER: ICD-10-CM

## 2021-10-11 PROCEDURE — 70486 CT MAXILLOFACIAL W/O DYE: CPT | Mod: 26,MG

## 2021-10-11 PROCEDURE — 76376 3D RENDER W/INTRP POSTPROCES: CPT

## 2021-10-11 PROCEDURE — 70450 CT HEAD/BRAIN W/O DYE: CPT | Mod: MC

## 2021-10-11 PROCEDURE — G1004: CPT

## 2021-10-11 PROCEDURE — 70486 CT MAXILLOFACIAL W/O DYE: CPT | Mod: MG

## 2021-10-11 PROCEDURE — 99285 EMERGENCY DEPT VISIT HI MDM: CPT

## 2021-10-11 PROCEDURE — 76376 3D RENDER W/INTRP POSTPROCES: CPT | Mod: 26

## 2021-10-11 PROCEDURE — 99284 EMERGENCY DEPT VISIT MOD MDM: CPT | Mod: 25

## 2021-10-11 PROCEDURE — 70450 CT HEAD/BRAIN W/O DYE: CPT | Mod: 26,MC

## 2021-10-11 NOTE — ED ADULT NURSE NOTE - OBJECTIVE STATEMENT
pt presents to the ED s/p fall. Pt states she woke up around 4:30am to go to the bathroom, slipped and fell. No LOC. Pt c/o right black eye. No other complaints at this time.

## 2021-10-11 NOTE — ED STATDOCS - EYES, MLM
clear bilaterally.  Pupils equal, round, and reactive to light. Pain with palpation right superior orbit. Periorbital contusion.
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
## 2021-10-11 NOTE — ED STATDOCS - CARE PROVIDER_API CALL
Tiffanie Jacques)  Ophthalmology  [address], [address]  [place] [address]  Phone: [phone]  Fax: [phone]  Follow Up Time: Urgent    Razia Blackman  ORAL/MAXILLOFACIAL SURGERY  [address], [address]  [place] [address]  Phone: [phone]  Fax: [phone]  Follow Up Time: Urgent

## 2021-10-11 NOTE — ED STATDOCS - OBJECTIVE STATEMENT
67 y/o female with a PMHx of asthma, back pain, HLD, HTN, insomnia, knee pain, morbid obesity, MI, osteoarthritis, pain management, RBBB, sleep apnea, spinal stenosis presents to the ED s/p fall. Pt states she woke up around 4:30am to go to the bathroom, slipped and fell. No LOC. Pt c/o right black eye. No other complaints at this time.

## 2021-10-11 NOTE — ED ADULT TRIAGE NOTE - CHIEF COMPLAINT QUOTE
pt presents to ED due to complaints right black eye pt states she slipped and fell in the bathroom - LOC , no blood thinners

## 2021-10-11 NOTE — ED STATDOCS - PHYSICAL EXAMINATION
----- Message from Jayna Wesley sent at 2/2/2017 10:35 AM CST -----  Contact: self  Pt needs stronger medication from recent appt. Please call [phone].  thanks   PA NOTE: GEN: AOX3, NAD. HEENT: Throat clear. Airway is patent. EYES: PERRLA. EOMI. +STS/+Ecchymosis right periorbital region. +Tenderness. Unable to fully open right eye. Head: NC/AT. NECK: Supple, No JVD. FROM. C-spine non-tender. CV:S1S2, RRR, LUNGS: Non-labored breathing, no tachypnea. O2sat 100% RA. CTA b/l. No w/r/r. CHEST: Equal chest expansion and rise. No deformity. ABD: Soft, NT/ND, no rebound, no guarding. No CVAT. EXT: No e/c/c. 2+ distal pulses. SKIN: No rashes. NEURO: No focal deficits. CN II-XII intact. FROM. 5/5 motor and sensory. ~Brayden Solano PA-C

## 2021-10-11 NOTE — ED STATDOCS - NSFOLLOWUPINSTRUCTIONS_ED_ALL_ED_FT
Eye Contusion      An eye contusion is a deep bruise of the eye or the area around the eye. This injury is often called a "black eye." Contusions are the result of a blunt injury to tissues and muscle fibers under the skin. This causes bleeding under the skin. The skin over the contusion may turn blue, purple, green, or yellow.    Minor injuries may be painless. More severe contusions may stay painful and swollen for a few weeks. Eye contusions can affect your eyeball and your eyesight.      What are the causes?  This condition may be caused by:  •A hard hit or direct force to your face, nose, or eye.      •A head injury that causes the blood under your skin to flow toward your eyelids.      •Facial surgery, such as a face-lift or nose surgery.      •Dental work, including wisdom tooth extraction or dental implant surgery.        What are the signs or symptoms?  Symptoms of this condition include:  •Pain and swelling around your eye.    •Discoloration around your eye.  •The area may start out red and then turn blue, purple, green, or yellow.        •Blurry vision.      •Tearing.      •Eyeball redness.        How is this diagnosed?  This condition is diagnosed based on a physical exam and your medical history. During the exam, your health care provider may:  •Test your eye motion and make sure that your eye is not injured.      •Shine a light into your eye to make sure that your pupil widens (dilates) normally.      •Check the bones in your face and around your eye for injuries.    •You may also have:  •A vision test.      •An X-ray or a CT scan to check for other injuries, such as broken bones.          How is this treated?  An eye contusion usually heals on its own in a few days or weeks. If needed, this condition may be treated by:  •Icing your eye and taking pain medicine.      •Surgery. This may be needed if you have broken bones or an injury to the eyeball.        Follow these instructions at home:      Managing pain and swelling    •If directed, put ice on the injured area:  •Put ice in a plastic bag.      •Place a towel between your skin and the bag.      •Leave the ice on for 20 minutes, 2–3 times a day.        General instructions     •Sleep with your head raised (elevated). You may do this by putting an extra pillow under your head.      •Return to your normal activities as told by your health care provider. Ask your health care provider what activities are safe for you.      •Take over-the-counter and prescription medicines only as told by your health care provider.      •Keep all follow-up visits as told by your health care provider. This is important.        Contact a health care provider if:    •Your symptoms do not improve after several days.      •Your swelling or pain is not relieved with medicines.        Get help right away if:    •You have vision loss.      •You have double vision.      •Your eye suddenly becomes red.      •Your pupil is an odd shape or size.      •You have severe pain.      •You have a severe headache.      •You feel nauseous or you vomit.      •You feel dizzy or sleepy, or you feel like you will faint.      •You faint.      •You have a lot of clear fluid or blood coming from your eye or nose.        Summary    •An eye contusion is a deep bruise of the eye or the area around the eye.      •Contusions are the result of a blunt injury to tissues and muscle fibers under the skin.      •An eye contusion usually heals on its own in a few days or weeks. If needed, it may be treated with ice and pain medicines.      •Surgery may be needed if you have broken bones or an injury to the eyeball.      This information is not intended to replace advice given to you by your health care provider. Make sure you discuss any questions you have with your health care provider.       Head Injury, Adult    There are many types of head injuries. Head injuries can be as minor as a small bump, or they can be a serious medical issue. More severe head injuries include:  •A jarring injury to the brain (concussion).      •A bruise (contusion) of the brain. This means there is bleeding in the brain that can cause swelling.      •A cracked skull (skull fracture).      •Bleeding in the brain that collects, clots, and forms a bump (hematoma).      After a head injury, most problems occur within the first 24 hours, but side effects may occur up to 7–10 days after the injury. It is important to watch your condition for any changes. You may need to be observed in the emergency department or urgent care, or you may be admitted to the hospital.      What are the causes?    There are many possible causes of a head injury. Serious head injuries may be caused by car accidents, bicycle or motorcycle accidents, sports injuries, falls, or being struck by an object.      What are the symptoms?  Symptoms of a head injury include a contusion, bump, or bleeding at the site of the injury. Other physical symptoms may include:  •Headache.      •Nausea or vomiting.      •Dizziness.      •Blurred or double vision.      •Being uncomfortable around bright lights or loud noises.      •Seizures.      •Feeling tired.      •Trouble being awakened.      •Loss of consciousness.    Mental or emotional symptoms may include:  •Irritability.      •Confusion and memory problems.      •Poor attention and concentration.      •Changes in eating or sleeping habits.      •Anxiety or depression.        How is this diagnosed?    This condition can usually be diagnosed based on your symptoms, a description of the injury, and a physical exam. You may also have imaging tests done, such as a CT scan or an MRI.      How is this treated?    Treatment for this condition depends on the severity and type of injury you have. The main goal of treatment is to prevent complications and allow the brain time to heal.    Mild head injury   If you have a mild head injury, you may be sent home, and treatment may include:  •Observation. A responsible adult should stay with you for 24 hours after your injury and check on you often.      •Physical rest.      •Brain rest.      •Pain medicines.      Severe head injury  If you have a severe head injury, treatment may include:•Close observation. This includes hospitalization with the following care:  •Frequent physical exams.      •Frequent checks of how your brain and nervous system are working (neurological status).      •Checking your blood pressure and oxygen levels.        •Medicines to relieve pain, prevent seizures, and decrease brain swelling.      •Airway protection and breathing support. This may include using a ventilator.      •Treatments that monitor and manage swelling inside the brain.    •Brain surgery. This may be needed to:  •Remove a collection of blood or blood clots.      •Stop the bleeding.      •Remove a part of the skull to allow room for the brain to swell.          Follow these instructions at home:    Activity     •Rest and avoid activities that are physically hard or tiring.      •Make sure you get enough sleep.    •Let your brain rest by limiting activities that require a lot of thought or attention, such as:  •Watching TV.      •Playing memory games and puzzles.      •Job-related work or homework.      •Working on the computer, using social media, and texting.        •Avoid activities that could cause another head injury, such as playing sports, until your health care provider approves. Having another head injury, especially before the first one has healed, can be dangerous.      •Ask your health care provider when it is safe for you to return to your regular activities, including work or school. Ask your health care provider for a step-by-step plan for gradually returning to activities.      •Ask your health care provider when you can drive, ride a bicycle, or use heavy machinery. Your ability to react may be slower after a brain injury. Do not do these activities if you are dizzy.        Lifestyle      • Do not drink alcohol until your health care provider approves. Do not use drugs. Alcohol and certain drugs may slow your recovery and can put you at risk of further injury.      •If it is harder than usual to remember things, write them down.      •If you are easily distracted, try to do one thing at a time.      •Talk with family members or close friends when making important decisions.      •Tell your friends, family, a trusted colleague, and  about your injury, symptoms, and restrictions. Have them watch for any new or worsening problems.      General instructions     •Take over-the-counter and prescription medicines only as told by your health care provider.      •Have someone stay with you for 24 hours after your head injury. This person should watch you for any changes in your symptoms and be ready to seek medical help.      •Keep all follow-up visits as told by your health care provider. This is important.        How is this prevented?    •Work on improving your balance and strength to avoid falls.      •Wear a seat belt when you are in a moving vehicle.      •Wear a helmet when riding a bicycle, skiing, or doing any other sport or activity that has a risk of injury.    •If you drink alcohol:•Limit how much you use to:  •0–1 drink a day for nonpregnant women.      •0–2 drinks a day for men.        •Be aware of how much alcohol is in your drink. In the [place] one drink equals one 12 oz bottle of beer (355 mL), one 5 oz glass of wine (148 mL), or one 1½ oz glass of hard liquor (44 mL).      •Take safety measures in your home, such as:  •Removing clutter and tripping hazards from floors and stairways.      •Using grab bars in bathrooms and handrails by stairs.      •Placing non-slip mats on floors and in bathtubs.      •Improving lighting in dim areas.          Where to find more information    •Centers for Disease Control and Prevention: www.cdc.gov        Get help right away if:  •You have:  •A severe headache that is not helped by medicine.      •Trouble walking or weakness in your arms and legs.      •Clear or bloody fluid coming from your nose or ears.      •Changes in your vision.      •A seizure.      •Increased confusion or irritability.        •Your symptoms get worse.      •You are sleepier than normal and have trouble staying awake.      •You lose your balance.      •Your pupils change size.      •Your speech is slurred.      •Your dizziness gets worse.      •You vomit.      These symptoms may represent a serious problem that is an emergency. Do not wait to see if the symptoms will go away. Get medical help right away. Call your local emergency services (911 in the [place] Do not drive yourself to the hospital.       Summary    •Head injuries can be minor, or they can be a serious medical issue requiring immediate attention.      •Treatment for this condition depends on the severity and type of injury you have.      •Have someone stay with you for 24 hours after your injury and check on you often.      •Ask your health care provider when it is safe for you to return to your regular activities, including work or school.      •Head injury prevention includes wearing a seat belt in a motor vehicle, using a helmet on a bicycle, limiting alcohol use, and taking safety measures in your home.      This information is not intended to replace advice given to you by your health care provider. Make sure you discuss any questions you have with your health care provider.

## 2021-10-11 NOTE — ED STATDOCS - PROVIDER TOKENS
PROVIDER:[TOKEN:[[id number]],FOLLOWUP:[Urgent]],PROVIDER:[TOKEN:[[id number]],FOLLOWUP:[Urgent]]

## 2021-10-11 NOTE — ED STATDOCS - PATIENT PORTAL LINK FT
You can access the FollowMyHealth Patient Portal offered by [facility] by registering at the following website: [email or web address]. By joining [facility] FollowMyHealth portal, you will also be able to view your health information using other applications (apps) compatible with our system.

## 2021-12-28 RX ORDER — AZITHROMYCIN 500 MG/1
1 TABLET, FILM COATED ORAL
Qty: 0 | Refills: 0 | DISCHARGE
Start: 2021-12-28 | End: 2022-01-02

## 2021-12-29 RX ORDER — DEXAMETHASONE 0.5 MG/5ML
1 ELIXIR ORAL
Qty: 0 | Refills: 0 | DISCHARGE
Start: 2021-12-29 | End: 2022-01-03

## 2022-01-04 ENCOUNTER — INPATIENT (INPATIENT)
Facility: HOSPITAL | Age: 67
LOS: 1 days | Discharge: HOME HEALTH | DRG: 551 | End: 2022-01-06
Attending: HOSPITALIST | Admitting: HOSPITALIST
Payer: COMMERCIAL

## 2022-01-04 VITALS
TEMPERATURE: 98 F | WEIGHT: 274.92 LBS | DIASTOLIC BLOOD PRESSURE: 84 MMHG | HEART RATE: 83 BPM | OXYGEN SATURATION: 95 % | RESPIRATION RATE: 18 BRPM | HEIGHT: 65 IN | SYSTOLIC BLOOD PRESSURE: 130 MMHG

## 2022-01-04 DIAGNOSIS — S32.010A WEDGE COMPRESSION FRACTURE OF FIRST LUMBAR VERTEBRA, INITIAL ENCOUNTER FOR CLOSED FRACTURE: ICD-10-CM

## 2022-01-04 DIAGNOSIS — Z98.89 OTHER SPECIFIED POSTPROCEDURAL STATES: Chronic | ICD-10-CM

## 2022-01-04 DIAGNOSIS — Z98.890 OTHER SPECIFIED POSTPROCEDURAL STATES: Chronic | ICD-10-CM

## 2022-01-04 DIAGNOSIS — Z98.84 BARIATRIC SURGERY STATUS: Chronic | ICD-10-CM

## 2022-01-04 DIAGNOSIS — Z90.49 ACQUIRED ABSENCE OF OTHER SPECIFIED PARTS OF DIGESTIVE TRACT: Chronic | ICD-10-CM

## 2022-01-04 DIAGNOSIS — Z95.1 PRESENCE OF AORTOCORONARY BYPASS GRAFT: Chronic | ICD-10-CM

## 2022-01-04 DIAGNOSIS — Z96.612 PRESENCE OF LEFT ARTIFICIAL SHOULDER JOINT: Chronic | ICD-10-CM

## 2022-01-04 LAB
ALBUMIN SERPL ELPH-MCNC: 3.1 G/DL — LOW (ref 3.3–5)
ALP SERPL-CCNC: 77 U/L — SIGNIFICANT CHANGE UP (ref 40–120)
ALT FLD-CCNC: 25 U/L — SIGNIFICANT CHANGE UP (ref 12–78)
ANION GAP SERPL CALC-SCNC: 5 MMOL/L — SIGNIFICANT CHANGE UP (ref 5–17)
AST SERPL-CCNC: 21 U/L — SIGNIFICANT CHANGE UP (ref 15–37)
BASOPHILS # BLD AUTO: 0 K/UL — SIGNIFICANT CHANGE UP (ref 0–0.2)
BASOPHILS NFR BLD AUTO: 0 % — SIGNIFICANT CHANGE UP (ref 0–2)
BILIRUB SERPL-MCNC: 0.6 MG/DL — SIGNIFICANT CHANGE UP (ref 0.2–1.2)
BUN SERPL-MCNC: 33 MG/DL — HIGH (ref 7–23)
CALCIUM SERPL-MCNC: 8.8 MG/DL — SIGNIFICANT CHANGE UP (ref 8.5–10.1)
CHLORIDE SERPL-SCNC: 107 MMOL/L — SIGNIFICANT CHANGE UP (ref 96–108)
CO2 SERPL-SCNC: 29 MMOL/L — SIGNIFICANT CHANGE UP (ref 22–31)
CREAT SERPL-MCNC: 1.02 MG/DL — SIGNIFICANT CHANGE UP (ref 0.5–1.3)
EOSINOPHIL # BLD AUTO: 0 K/UL — SIGNIFICANT CHANGE UP (ref 0–0.5)
EOSINOPHIL NFR BLD AUTO: 0 % — SIGNIFICANT CHANGE UP (ref 0–6)
FLUAV AG NPH QL: SIGNIFICANT CHANGE UP
FLUBV AG NPH QL: SIGNIFICANT CHANGE UP
GLUCOSE SERPL-MCNC: 84 MG/DL — SIGNIFICANT CHANGE UP (ref 70–99)
HCT VFR BLD CALC: 44 % — SIGNIFICANT CHANGE UP (ref 34.5–45)
HGB BLD-MCNC: 14.3 G/DL — SIGNIFICANT CHANGE UP (ref 11.5–15.5)
LACTATE SERPL-SCNC: 1.5 MMOL/L — SIGNIFICANT CHANGE UP (ref 0.7–2)
LYMPHOCYTES # BLD AUTO: 14 % — SIGNIFICANT CHANGE UP (ref 13–44)
LYMPHOCYTES # BLD AUTO: 2.32 K/UL — SIGNIFICANT CHANGE UP (ref 1–3.3)
MAGNESIUM SERPL-MCNC: 1.9 MG/DL — SIGNIFICANT CHANGE UP (ref 1.6–2.6)
MCHC RBC-ENTMCNC: 32.4 PG — SIGNIFICANT CHANGE UP (ref 27–34)
MCHC RBC-ENTMCNC: 32.5 GM/DL — SIGNIFICANT CHANGE UP (ref 32–36)
MCV RBC AUTO: 99.5 FL — SIGNIFICANT CHANGE UP (ref 80–100)
MONOCYTES # BLD AUTO: 1.66 K/UL — HIGH (ref 0–0.9)
MONOCYTES NFR BLD AUTO: 10 % — SIGNIFICANT CHANGE UP (ref 2–14)
NEUTROPHILS # BLD AUTO: 12.59 K/UL — HIGH (ref 1.8–7.4)
NEUTROPHILS NFR BLD AUTO: 76 % — SIGNIFICANT CHANGE UP (ref 43–77)
NRBC # BLD: SIGNIFICANT CHANGE UP /100 WBCS (ref 0–0)
PLATELET # BLD AUTO: 259 K/UL — SIGNIFICANT CHANGE UP (ref 150–400)
POTASSIUM SERPL-MCNC: 3.4 MMOL/L — LOW (ref 3.5–5.3)
POTASSIUM SERPL-SCNC: 3.4 MMOL/L — LOW (ref 3.5–5.3)
PROT SERPL-MCNC: 6.7 GM/DL — SIGNIFICANT CHANGE UP (ref 6–8.3)
RBC # BLD: 4.42 M/UL — SIGNIFICANT CHANGE UP (ref 3.8–5.2)
RBC # FLD: 13.7 % — SIGNIFICANT CHANGE UP (ref 10.3–14.5)
RSV RNA NPH QL NAA+NON-PROBE: SIGNIFICANT CHANGE UP
SARS-COV-2 RNA SPEC QL NAA+PROBE: DETECTED
SODIUM SERPL-SCNC: 141 MMOL/L — SIGNIFICANT CHANGE UP (ref 135–145)
TROPONIN I, HIGH SENSITIVITY RESULT: 69.74 NG/L — HIGH
WBC # BLD: 16.57 K/UL — HIGH (ref 3.8–10.5)
WBC # FLD AUTO: 16.57 K/UL — HIGH (ref 3.8–10.5)

## 2022-01-04 PROCEDURE — 97163 PT EVAL HIGH COMPLEX 45 MIN: CPT | Mod: GP

## 2022-01-04 PROCEDURE — 85379 FIBRIN DEGRADATION QUANT: CPT

## 2022-01-04 PROCEDURE — 99285 EMERGENCY DEPT VISIT HI MDM: CPT

## 2022-01-04 PROCEDURE — 70450 CT HEAD/BRAIN W/O DYE: CPT | Mod: 26,MA

## 2022-01-04 PROCEDURE — 83735 ASSAY OF MAGNESIUM: CPT

## 2022-01-04 PROCEDURE — 80048 BASIC METABOLIC PNL TOTAL CA: CPT

## 2022-01-04 PROCEDURE — 36415 COLL VENOUS BLD VENIPUNCTURE: CPT

## 2022-01-04 PROCEDURE — 71045 X-RAY EXAM CHEST 1 VIEW: CPT | Mod: 26

## 2022-01-04 PROCEDURE — 97116 GAIT TRAINING THERAPY: CPT | Mod: GP

## 2022-01-04 PROCEDURE — 99223 1ST HOSP IP/OBS HIGH 75: CPT

## 2022-01-04 PROCEDURE — 81001 URINALYSIS AUTO W/SCOPE: CPT

## 2022-01-04 PROCEDURE — 87086 URINE CULTURE/COLONY COUNT: CPT

## 2022-01-04 PROCEDURE — 93010 ELECTROCARDIOGRAM REPORT: CPT

## 2022-01-04 PROCEDURE — 85025 COMPLETE CBC W/AUTO DIFF WBC: CPT

## 2022-01-04 PROCEDURE — 84484 ASSAY OF TROPONIN QUANT: CPT

## 2022-01-04 PROCEDURE — 72125 CT NECK SPINE W/O DYE: CPT | Mod: 26,MA

## 2022-01-04 PROCEDURE — 72131 CT LUMBAR SPINE W/O DYE: CPT | Mod: 26,MA

## 2022-01-04 PROCEDURE — 84100 ASSAY OF PHOSPHORUS: CPT

## 2022-01-04 RX ORDER — ATORVASTATIN CALCIUM 80 MG/1
80 TABLET, FILM COATED ORAL AT BEDTIME
Refills: 0 | Status: DISCONTINUED | OUTPATIENT
Start: 2022-01-04 | End: 2022-01-06

## 2022-01-04 RX ORDER — MORPHINE SULFATE 50 MG/1
4 CAPSULE, EXTENDED RELEASE ORAL ONCE
Refills: 0 | Status: DISCONTINUED | OUTPATIENT
Start: 2022-01-04 | End: 2022-01-04

## 2022-01-04 RX ORDER — ONDANSETRON 8 MG/1
4 TABLET, FILM COATED ORAL ONCE
Refills: 0 | Status: COMPLETED | OUTPATIENT
Start: 2022-01-04 | End: 2022-01-04

## 2022-01-04 RX ORDER — DICLOFENAC SODIUM 30 MG/G
1 GEL TOPICAL
Qty: 0 | Refills: 0 | DISCHARGE

## 2022-01-04 RX ORDER — RANOLAZINE 500 MG/1
1 TABLET, FILM COATED, EXTENDED RELEASE ORAL
Qty: 0 | Refills: 0 | DISCHARGE

## 2022-01-04 RX ORDER — POTASSIUM CHLORIDE 20 MEQ
20 PACKET (EA) ORAL ONCE
Refills: 0 | Status: COMPLETED | OUTPATIENT
Start: 2022-01-04 | End: 2022-01-04

## 2022-01-04 RX ORDER — ENOXAPARIN SODIUM 100 MG/ML
40 INJECTION SUBCUTANEOUS DAILY
Refills: 0 | Status: DISCONTINUED | OUTPATIENT
Start: 2022-01-04 | End: 2022-01-06

## 2022-01-04 RX ORDER — TRAZODONE HCL 50 MG
1 TABLET ORAL
Qty: 0 | Refills: 0 | DISCHARGE

## 2022-01-04 RX ORDER — DULOXETINE HYDROCHLORIDE 30 MG/1
1 CAPSULE, DELAYED RELEASE ORAL
Qty: 0 | Refills: 0 | DISCHARGE

## 2022-01-04 RX ORDER — BNT162B2 0.23 MG/2.25ML
0.3 INJECTION, SUSPENSION INTRAMUSCULAR
Qty: 0 | Refills: 0 | DISCHARGE

## 2022-01-04 RX ORDER — SODIUM CHLORIDE 9 MG/ML
1000 INJECTION INTRAMUSCULAR; INTRAVENOUS; SUBCUTANEOUS ONCE
Refills: 0 | Status: COMPLETED | OUTPATIENT
Start: 2022-01-04 | End: 2022-01-04

## 2022-01-04 RX ORDER — ASCORBIC ACID 60 MG
500 TABLET,CHEWABLE ORAL DAILY
Refills: 0 | Status: DISCONTINUED | OUTPATIENT
Start: 2022-01-04 | End: 2022-01-06

## 2022-01-04 RX ORDER — QUETIAPINE FUMARATE 200 MG/1
1 TABLET, FILM COATED ORAL
Qty: 0 | Refills: 0 | DISCHARGE

## 2022-01-04 RX ORDER — CYCLOBENZAPRINE HYDROCHLORIDE 10 MG/1
5 TABLET, FILM COATED ORAL DAILY
Refills: 0 | Status: DISCONTINUED | OUTPATIENT
Start: 2022-01-04 | End: 2022-01-05

## 2022-01-04 RX ORDER — ASPIRIN/CALCIUM CARB/MAGNESIUM 324 MG
81 TABLET ORAL DAILY
Refills: 0 | Status: DISCONTINUED | OUTPATIENT
Start: 2022-01-04 | End: 2022-01-06

## 2022-01-04 RX ORDER — LIDOCAINE 4 G/100G
1 CREAM TOPICAL DAILY
Refills: 0 | Status: DISCONTINUED | OUTPATIENT
Start: 2022-01-04 | End: 2022-01-06

## 2022-01-04 RX ORDER — DOCUSATE SODIUM 100 MG
1 CAPSULE ORAL
Qty: 0 | Refills: 0 | DISCHARGE

## 2022-01-04 RX ORDER — METOPROLOL TARTRATE 50 MG
1 TABLET ORAL
Qty: 0 | Refills: 0 | DISCHARGE

## 2022-01-04 RX ORDER — ASPIRIN/CALCIUM CARB/MAGNESIUM 324 MG
1 TABLET ORAL
Qty: 0 | Refills: 0 | DISCHARGE

## 2022-01-04 RX ORDER — ESCITALOPRAM OXALATE 10 MG/1
20 TABLET, FILM COATED ORAL DAILY
Refills: 0 | Status: DISCONTINUED | OUTPATIENT
Start: 2022-01-04 | End: 2022-01-06

## 2022-01-04 RX ORDER — TEMAZEPAM 15 MG/1
15 CAPSULE ORAL AT BEDTIME
Refills: 0 | Status: DISCONTINUED | OUTPATIENT
Start: 2022-01-04 | End: 2022-01-06

## 2022-01-04 RX ORDER — HYDROMORPHONE HYDROCHLORIDE 2 MG/ML
1 INJECTION INTRAMUSCULAR; INTRAVENOUS; SUBCUTANEOUS EVERY 4 HOURS
Refills: 0 | Status: DISCONTINUED | OUTPATIENT
Start: 2022-01-04 | End: 2022-01-06

## 2022-01-04 RX ORDER — CARVEDILOL PHOSPHATE 80 MG/1
25 CAPSULE, EXTENDED RELEASE ORAL EVERY 12 HOURS
Refills: 0 | Status: DISCONTINUED | OUTPATIENT
Start: 2022-01-04 | End: 2022-01-06

## 2022-01-04 RX ADMIN — ONDANSETRON 4 MILLIGRAM(S): 8 TABLET, FILM COATED ORAL at 13:57

## 2022-01-04 RX ADMIN — SODIUM CHLORIDE 2000 MILLILITER(S): 9 INJECTION INTRAMUSCULAR; INTRAVENOUS; SUBCUTANEOUS at 13:57

## 2022-01-04 RX ADMIN — ATORVASTATIN CALCIUM 80 MILLIGRAM(S): 80 TABLET, FILM COATED ORAL at 22:15

## 2022-01-04 RX ADMIN — TEMAZEPAM 15 MILLIGRAM(S): 15 CAPSULE ORAL at 22:15

## 2022-01-04 RX ADMIN — HYDROMORPHONE HYDROCHLORIDE 1 MILLIGRAM(S): 2 INJECTION INTRAMUSCULAR; INTRAVENOUS; SUBCUTANEOUS at 19:24

## 2022-01-04 RX ADMIN — MORPHINE SULFATE 4 MILLIGRAM(S): 50 CAPSULE, EXTENDED RELEASE ORAL at 13:58

## 2022-01-04 RX ADMIN — MORPHINE SULFATE 4 MILLIGRAM(S): 50 CAPSULE, EXTENDED RELEASE ORAL at 16:00

## 2022-01-04 RX ADMIN — Medication 20 MILLIEQUIVALENT(S): at 22:15

## 2022-01-04 NOTE — ED PROVIDER NOTE - NS ED ROS FT
Constitutional: No fever or chills  Eyes: No visual changes  HEENT: No throat pain  CV: No chest pain  Resp: No SOB no cough  GI: No abd pain, nausea or vomiting  : No dysuria  MSK: + back pain   Skin: No rash  Neuro:+ headache, weakness, difficulty walking

## 2022-01-04 NOTE — ED ADULT NURSE REASSESSMENT NOTE - NS ED NURSE REASSESS COMMENT FT1
Assumed care of pt from ilan RN. pt changed and positioned at this time. Awaiting to give report to 3E
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
## 2022-01-04 NOTE — PHARMACOTHERAPY INTERVENTION NOTE - COMMENTS
Medication history complete, reviewed medications with patient and [facility]

## 2022-01-04 NOTE — ED PROVIDER NOTE - PHYSICAL EXAMINATION
Constitutional: NAD AAOx3  Eyes: PERRLA EOMI  Head: Normocephalic atraumatic  Mouth: MMM  Cardiac: regular rate   Resp: Lungs CTAB  GI: Abd s/nt/nd  Back: + TTP of low lumbar spine  Neuro: CN2-12 intact, NIH0   Skin: No rashes

## 2022-01-04 NOTE — H&P ADULT - ASSESSMENT
67 yo female with PMHx. of   chronic pain on pain management with Dr. Pagan, LS stenosis, Low back pain, Morbid  Obestiy s/p gastric sleeve, CAD s/p MI, CABG x2  in 2009 done in [place], AWAIS,  RBBB, insomnia, who presented in the ER via EMS after she fell in the [facility] parking lot and hit the back or her head. She denies LOC. She states she fell multiple times in the past month, her legs just give out. She had neurology evaluation at [facility] and he was referred to ortho spine.   She denies HA, Dizziness, CP. CT LS: Moderate acute to subacute compression fracture superior endplate of L1.     assessment Dx:                       1. Acute compression fracture L1                       2. LS stenosis / chronic pain syndrome                       3. COVID 19 infection, not hypoxic,                        4. Leukocytosis                        5. Elevated Troponin                       6. CAD    Plan:    admit to : Telemetry               medications: as per med rec. Dilaudid for pain, Flexeril,                VTEP: lovenox                Labs: cbc, bmp               Radiology: CTH, CTLS , CTneck               Cardiac diagnostics: Troponins, EKG                Consults: Ortho spine, Cardiology , PT eval                 Advance Directive: full code,                                   65 yo female with PMHx. of   chronic pain on pain management with Dr. Pagan, LS stenosis, Low back pain, Morbid  Obestiy s/p gastric sleeve, CAD s/p MI, CABG x2  in 2009 done in [place], AWAIS,  RBBB, insomnia, who presented in the ER via EMS after she fell in the [facility] parking lot and hit the back or her head. She denies LOC. She states she fell multiple times in the past month, her legs just give out. She had neurology evaluation at [facility] and he was referred to ortho spine.   She denies HA, Dizziness, CP. CT LS: Moderate acute to subacute compression fracture superior endplate of L1.     assessment Dx:                       1. Acute compression fracture L1                       2. LS stenosis / chronic pain syndrome                       3. COVID 19 infection, not hypoxic,                        4. Leukocytosis                        5. Elevated Troponin                       6. CAD                       7. Hypokalemia     Plan:    admit to : Telemetry               medications: as per med rec. Dilaudid for pain, Flexeril, KCL 20                VTEP: lovenox                Labs: cbc, bmp, UA , C/S                Radiology: CTH, CTLS , CTneck               Cardiac diagnostics: Troponins, EKG                Consults: Ortho spine, Cardiology , PT eval                 Advance Directive: full code,                                   67 yo female with PMHx. of   chronic pain on pain management with Dr. Pagan, LS stenosis, Low back pain, Morbid  Obestiy s/p gastric sleeve, CAD s/p MI, CABG x2  in 2009 done in [place], AWAIS,  RBBB, insomnia, who presented in the ER via EMS after she fell in the [facility] parking lot and hit the back or her head. She denies LOC. She states she fell multiple times in the past month, her legs just give out. She had neurology evaluation at [facility] and he was referred to ortho spine.   She denies HA, Dizziness, CP. CT LS: Moderate acute to subacute compression fracture superior endplate of L1.     assessment Dx:                       1. Acute compression fracture L1                       2. LS stenosis / chronic pain syndrome                       3. COVID 19 infection, not hypoxic,                        4. Leukocytosis                        5. Elevated Troponin                       6. CAD                       7. Hypokalemia                        8. Bradycardia     Plan:    admit to : Telemetry               medications: as per med rec. Dilaudid for pain, Flexeril, KCL 20                VTEP: lovenox                Labs: cbc, bmp, UA , C/S                Radiology: CTH, CTLS , CTneck               Cardiac diagnostics: Troponins, EKG                Consults: Ortho spine, Cardiology , PT eval                 Advance Directive: full code,                                   65 yo female with PMHx. of   chronic pain on pain management with Dr. Pagan, LS stenosis, Low back pain, Morbid  Obestiy s/p gastric sleeve, CAD s/p MI, CABG x2  in 2009 done in [place], AWAIS,  RBBB, insomnia, who presented in the ER via EMS after she fell in the [facility] parking lot and hit the back or her head. She denies LOC. She states she fell multiple times in the past month, her legs just give out. She had neurology evaluation at [facility] and he was referred to ortho spine.   She denies HA, Dizziness, CP. CT LS: Moderate acute to subacute compression fracture superior endplate of L1.     assessment Dx:                       1. Acute compression fracture L1                       2. LS stenosis / chronic pain syndrome                       3. COVID 19 infection, not hypoxic,                        4. Leukocytosis  UA, C/S ordered, CXRay neg                       5. Elevated Troponin                       6. CAD                       7. Hypokalemia                        8. Bradycardia     Plan:    admit to : Telemetry               medications: as per med rec. Dilaudid for pain, Flexeril, KCL 20                VTEP: lovenox                Labs: cbc, bmp, UA , C/S                Radiology: CTH, CTLS , CTneck               Cardiac diagnostics: Troponins, EKG                Consults: Ortho spine, Cardiology , PT eval                 Advance Directive: full code,

## 2022-01-04 NOTE — ED ADULT NURSE NOTE - NSIMPLEMENTINTERV_GEN_ALL_ED
Implemented All Fall Risk Interventions:  [place] to call system. Call bell, personal items and telephone within reach. Instruct patient to call for assistance. Room bathroom lighting operational. Non-slip footwear when patient is off stretcher. Physically safe environment: no spills, clutter or unnecessary equipment. Stretcher in lowest position, wheels locked, appropriate side rails in place. Provide visual cue, wrist band, yellow gown, etc. Monitor gait and stability. Monitor for mental status changes and reorient to person, place, and time. Review medications for side effects contributing to fall risk. Reinforce activity limits and safety measures with patient and family.

## 2022-01-04 NOTE — ED PROVIDER NOTE - CARE PLAN
Principal Discharge DX:	Compression fracture of L1 vertebra  Secondary Diagnosis:	Elevated troponin   1

## 2022-01-04 NOTE — ED PROVIDER NOTE - CLINICAL SUMMARY MEDICAL DECISION MAKING FREE TEXT BOX
Labs and imaging obtained, CT head showed no acute process, CT Lumbar spine was positive for compression fracture of L1, I discussed with spine surgery for consultation. patient baseline has a history of spinal stenosis and is in pain management for this. In addition, her troponin came back up elevated, she denied any CP here. I discussed with hospitalist and she will be admitted for further evaluation.

## 2022-01-04 NOTE — ED ADULT NURSE NOTE - CHIEF COMPLAINT QUOTE
Pt brought in by ambulance from [facility] where she apparently fell outside on the concrete and hit her head,. Pt does not remember being at the doctor, but states that she fell at home going to the bathroom. EMS states that patient hit the back of her head. EMS states that she has fallen several times in the past few weeks.

## 2022-01-04 NOTE — H&P ADULT - HISTORY OF PRESENT ILLNESS
HPI: The patient is a 67 yo female with PMHx. of   chronic pain on pain management with Dr. Pagan, LS stenosis, Low back pain, Morbid  Obestiy s/p gastric sleeve, CAD s/p MI, CABG x2  in  done in [place], AWAIS,  RBBB, insomnia, who presented in the ER via EMS after she fell in the [facility] parking lot and hit the back or her head. She denies LOC. She states she fell multiple times in the past month, her legs just give out. She had neurology evaluation at [facility] and he was referred to ortho spine.   She denies HA, Dizziness, CP.     PMHx: Chronic pain on pain management with Dr. Pagan, LS stenosis, Low back pain, Morbid  Obestiy s/p gastric sleeve, CAD s/p MI, CABG x2  in  done in [place], AWAIS,  RBBB    PSHx: .: R TKR,  CABG x2 ,  C sec., Cholecystectomy, Sleeve Gastrectomy in , L shoulder replacement , LLL lobectomy at age 7.    Family Hx: :  mother had CVA and  at age 70,  Father  at age 67 from MI.     Social Hx.: not smoking, denies  alcohol abuse    ROS: as in HPI   Eyes: no changes in vision    ENT/Mouth: no changes    Cardiovascular: no chest pain    Respiratory: no SOB    Gastrointestinal: no diarrhea, no nausea, no vomiting    Genitourinary: no dysuria    Breast: no pain    Musculoskeletal: sever low back pain,     Integumentary: no itching    Neurological: No Headache, no tremor,    Psychiatric: no suicidal ideations    Endocrine: no excessive thirst,     Hematologic/Lymphatic: no swollen glands    Allergic/Immunologic: no itching      Physical Exam: Vital Signs Last 24 Hrs  T(C): 36.8 (2022 15:24), Max: 36.9 (2022 12:20)  T(F): 98.2 (2022 15:24), Max: 98.4 (2022 12:20)  HR: 50 (2022 15:24) (50 - 83)  BP: 132/75 (2022 15:24) (130/84 - 132/75)  BP(mean): 89 (2022 15:24) (89 - 89)  RR: 18 (2022 15:24) (18 - 18)  SpO2: 99% (2022 15:24) (95% - 99%)        HEENT: PRRL EOMI    MOUTH/TEETH/GUMS: Clear    NECK: no JVD    LUNGS: Clear    HEART: S1,S2 RR    ABDOMEN: soft nontender    EXTREMITIES:  no pedal edema    MUSCULOSKELETAL: no joint swelling , low back pain exacerbated by mobilization    NEURO: AO x3, no tremor, no focal weakness,     SKIN: no rash    : CVA negative,     Lab:                       14.3   16.57 )-----------( 259      ( 2022 13:09 )             44.0       01-04    141  |  107  |  33<H>  ----------------------------<  84  3.4<L>   |  29  |  1.02    Ca    8.8      2022 13:09  Mg     1.9     01-04    TPro  6.7  /  Alb  3.1<L>  /  TBili  0.6  /  DBili  x   /  AST  21  /  ALT  25  /  AlkPhos  77  01-04      CT LS: Moderate acute to subacute compression fracture superior endplate of L1.   Mild retropulsion along the right side towards the superior endplate.   CTH neg,   CT C/S neg,  CXRay neg,   Troponin 69  EKG sinus bradycardia    COVID 19 PCR positive

## 2022-01-04 NOTE — ED ADULT TRIAGE NOTE - WEIGHT METHOD
40-year-old woman, followed by Dr. Alexander, admitted for presurgical evaluation and medication titration.  Onfi held on admission, last dose of Keppra this morning, remains on full home dose of Lamictal for now.  EEG normal so far.  MRI independently reviewed by me, notable for R insular lesion.  HV/photic today, likely reduce Lamictal dose tomorrow. One seizure recorded this morning: forced head version to the right at onset but left arm extension/right arm flexion, tonic-clonic.  EEG with simultaneous diffuse onset, possibly maximal in left frontal region.  Overall most consistent with frontal onset, possibly on the left but unclear.  Discussed with Dr. Brunner -- will try to record one more seizure, but may not be clearly localizing due to suspected onset and rapid generalization.  Continue Lamictal 50 mg BID for now.  Seizure precautions reviewed with patient, particularly importance of staying in bed with bed rails up at all times and letting RN know if she needs to use the restroom, etc. 4 more seizures overnight (see separate EEG report for details).  Suspect frontal onset, possibly left frontal, with rapid bisychrony.  AEDs resumed -- Lamictal 150 mg BID, Keppra 1g BID, Onfi 5 mg QHS (plan to taper off Onfi and Keppra as an outpatient).  Will plan for outpatient PET, possibly to be followed by JOSE or ictal SPECT ahead of invasive monitoring.  Discussed with Dr. Brunner. No more seizures since resuming home medication regimen with increased Lamictal dose.  Complaining of severe back pain this morning.  Likely due to seizures/hospital bed, but will obtain T/L spine X ray to rule out compression fracture. No seizures yet, EEG does show rare L frontotemporal spikes.  Will reduce Lamictal dose and sleep deprive tonight to attempt to record seizures.  Seizure precautions reviewed with patient -- needs to stay in bed with bedrails up as she is at high risk for GTC while off meds.  Discussed with Dr. Brunner. stated

## 2022-01-04 NOTE — ED ADULT TRIAGE NOTE - CCCP TRG CHIEF CMPLNT
ROS/Med Hx    Anesthesia Plan     ASA Status: 3  Anesthesia Type: L&D Epidural to   Reviewed: Lab Results, NPO Status, Beta Blocker Status, Medications, Past Med History, Problem List, Allergies, DNR Status and Patient Summary  The proposed anesthetic plan, including its risks and benefits, have been discussed with the Patient - along with the risks and benefits of alternatives.  Questions were encouraged and answered and the patient and/or representative agrees to proceed.  Informed Consent for Blood: Consented  Blood Products: Not Anticipated  Plan Comments: Called for urgent  section for abnormal fetal heart rate.  Discussed risks, benefits, alternatives with patient who understands and agrees to proceed.      Physical Exam  Mallampati: II  TM Distance: >3 FB  Neck ROM: Full  Patient Demonstrates:  Gravid and Obese                  
fall

## 2022-01-04 NOTE — ED PROVIDER NOTE - OBJECTIVE STATEMENT
This patient presents via EMS s/p fall, she was at her doctor's office when this happened, she stated that her legs became weak and shaky, she fell backward, hit her head. no LOC but c/o of head and low back pain. She stated that she has been having difficulty with walking and multiple falls over the last couple of months, she stated that after arelis she had been in a hospital in [place] for this but is unable to tell me what she was diagnosed with. She denied any numbness/tingling in her extremities. She denied any CP/SOB.

## 2022-01-05 LAB
ANION GAP SERPL CALC-SCNC: 4 MMOL/L — LOW (ref 5–17)
APPEARANCE UR: CLEAR — SIGNIFICANT CHANGE UP
BASOPHILS # BLD AUTO: 0.04 K/UL — SIGNIFICANT CHANGE UP (ref 0–0.2)
BASOPHILS NFR BLD AUTO: 0.3 % — SIGNIFICANT CHANGE UP (ref 0–2)
BILIRUB UR-MCNC: NEGATIVE — SIGNIFICANT CHANGE UP
BUN SERPL-MCNC: 23 MG/DL — SIGNIFICANT CHANGE UP (ref 7–23)
CALCIUM SERPL-MCNC: 8.5 MG/DL — SIGNIFICANT CHANGE UP (ref 8.5–10.1)
CHLORIDE SERPL-SCNC: 109 MMOL/L — HIGH (ref 96–108)
CO2 SERPL-SCNC: 24 MMOL/L — SIGNIFICANT CHANGE UP (ref 22–31)
COLOR SPEC: YELLOW — SIGNIFICANT CHANGE UP
CREAT SERPL-MCNC: 0.65 MG/DL — SIGNIFICANT CHANGE UP (ref 0.5–1.3)
D DIMER BLD IA.RAPID-MCNC: 759 NG/ML DDU — HIGH
DIFF PNL FLD: NEGATIVE — SIGNIFICANT CHANGE UP
EOSINOPHIL # BLD AUTO: 0.12 K/UL — SIGNIFICANT CHANGE UP (ref 0–0.5)
EOSINOPHIL NFR BLD AUTO: 1 % — SIGNIFICANT CHANGE UP (ref 0–6)
GLUCOSE SERPL-MCNC: 84 MG/DL — SIGNIFICANT CHANGE UP (ref 70–99)
GLUCOSE UR QL: NEGATIVE MG/DL — SIGNIFICANT CHANGE UP
HCT VFR BLD CALC: 42.1 % — SIGNIFICANT CHANGE UP (ref 34.5–45)
HGB BLD-MCNC: 13.6 G/DL — SIGNIFICANT CHANGE UP (ref 11.5–15.5)
IMM GRANULOCYTES NFR BLD AUTO: 3.3 % — HIGH (ref 0–1.5)
KETONES UR-MCNC: NEGATIVE — SIGNIFICANT CHANGE UP
LEUKOCYTE ESTERASE UR-ACNC: ABNORMAL
LYMPHOCYTES # BLD AUTO: 19.8 % — SIGNIFICANT CHANGE UP (ref 13–44)
LYMPHOCYTES # BLD AUTO: 2.43 K/UL — SIGNIFICANT CHANGE UP (ref 1–3.3)
MAGNESIUM SERPL-MCNC: 1.9 MG/DL — SIGNIFICANT CHANGE UP (ref 1.6–2.6)
MCHC RBC-ENTMCNC: 32.2 PG — SIGNIFICANT CHANGE UP (ref 27–34)
MCHC RBC-ENTMCNC: 32.3 GM/DL — SIGNIFICANT CHANGE UP (ref 32–36)
MCV RBC AUTO: 99.8 FL — SIGNIFICANT CHANGE UP (ref 80–100)
MONOCYTES # BLD AUTO: 1.25 K/UL — HIGH (ref 0–0.9)
MONOCYTES NFR BLD AUTO: 10.2 % — SIGNIFICANT CHANGE UP (ref 2–14)
NEUTROPHILS # BLD AUTO: 8.05 K/UL — HIGH (ref 1.8–7.4)
NEUTROPHILS NFR BLD AUTO: 65.4 % — SIGNIFICANT CHANGE UP (ref 43–77)
NITRITE UR-MCNC: NEGATIVE — SIGNIFICANT CHANGE UP
PH UR: 5 — SIGNIFICANT CHANGE UP (ref 5–8)
PHOSPHATE SERPL-MCNC: 2.6 MG/DL — SIGNIFICANT CHANGE UP (ref 2.5–4.5)
PLATELET # BLD AUTO: 222 K/UL — SIGNIFICANT CHANGE UP (ref 150–400)
POTASSIUM SERPL-MCNC: 3.7 MMOL/L — SIGNIFICANT CHANGE UP (ref 3.5–5.3)
POTASSIUM SERPL-SCNC: 3.7 MMOL/L — SIGNIFICANT CHANGE UP (ref 3.5–5.3)
PROT UR-MCNC: 15 MG/DL
RBC # BLD: 4.22 M/UL — SIGNIFICANT CHANGE UP (ref 3.8–5.2)
RBC # FLD: 13.7 % — SIGNIFICANT CHANGE UP (ref 10.3–14.5)
SODIUM SERPL-SCNC: 137 MMOL/L — SIGNIFICANT CHANGE UP (ref 135–145)
SP GR SPEC: 1.01 — SIGNIFICANT CHANGE UP (ref 1.01–1.02)
TROPONIN I, HIGH SENSITIVITY RESULT: 44 NG/L — SIGNIFICANT CHANGE UP
UROBILINOGEN FLD QL: NEGATIVE MG/DL — SIGNIFICANT CHANGE UP
WBC # BLD: 12.3 K/UL — HIGH (ref 3.8–10.5)
WBC # FLD AUTO: 12.3 K/UL — HIGH (ref 3.8–10.5)

## 2022-01-05 PROCEDURE — 99221 1ST HOSP IP/OBS SF/LOW 40: CPT

## 2022-01-05 PROCEDURE — 99232 SBSQ HOSP IP/OBS MODERATE 35: CPT

## 2022-01-05 RX ORDER — HYDRALAZINE HCL 50 MG
10 TABLET ORAL ONCE
Refills: 0 | Status: COMPLETED | OUTPATIENT
Start: 2022-01-05 | End: 2022-01-05

## 2022-01-05 RX ORDER — SODIUM CHLORIDE 9 MG/ML
1000 INJECTION INTRAMUSCULAR; INTRAVENOUS; SUBCUTANEOUS
Refills: 0 | Status: DISCONTINUED | OUTPATIENT
Start: 2022-01-05 | End: 2022-01-06

## 2022-01-05 RX ORDER — CYCLOBENZAPRINE HYDROCHLORIDE 10 MG/1
5 TABLET, FILM COATED ORAL THREE TIMES A DAY
Refills: 0 | Status: DISCONTINUED | OUTPATIENT
Start: 2022-01-05 | End: 2022-01-06

## 2022-01-05 RX ADMIN — HYDROMORPHONE HYDROCHLORIDE 1 MILLIGRAM(S): 2 INJECTION INTRAMUSCULAR; INTRAVENOUS; SUBCUTANEOUS at 20:18

## 2022-01-05 RX ADMIN — CARVEDILOL PHOSPHATE 25 MILLIGRAM(S): 80 CAPSULE, EXTENDED RELEASE ORAL at 09:31

## 2022-01-05 RX ADMIN — HYDROMORPHONE HYDROCHLORIDE 1 MILLIGRAM(S): 2 INJECTION INTRAMUSCULAR; INTRAVENOUS; SUBCUTANEOUS at 11:03

## 2022-01-05 RX ADMIN — ATORVASTATIN CALCIUM 80 MILLIGRAM(S): 80 TABLET, FILM COATED ORAL at 20:11

## 2022-01-05 RX ADMIN — Medication 500 MILLIGRAM(S): at 09:32

## 2022-01-05 RX ADMIN — ESCITALOPRAM OXALATE 20 MILLIGRAM(S): 10 TABLET, FILM COATED ORAL at 09:32

## 2022-01-05 RX ADMIN — ENOXAPARIN SODIUM 40 MILLIGRAM(S): 100 INJECTION SUBCUTANEOUS at 09:33

## 2022-01-05 RX ADMIN — LIDOCAINE 1 PATCH: 4 CREAM TOPICAL at 23:17

## 2022-01-05 RX ADMIN — SODIUM CHLORIDE 125 MILLILITER(S): 9 INJECTION INTRAMUSCULAR; INTRAVENOUS; SUBCUTANEOUS at 18:33

## 2022-01-05 RX ADMIN — CYCLOBENZAPRINE HYDROCHLORIDE 5 MILLIGRAM(S): 10 TABLET, FILM COATED ORAL at 09:32

## 2022-01-05 RX ADMIN — HYDROMORPHONE HYDROCHLORIDE 1 MILLIGRAM(S): 2 INJECTION INTRAMUSCULAR; INTRAVENOUS; SUBCUTANEOUS at 23:17

## 2022-01-05 RX ADMIN — Medication 20 MILLIGRAM(S): at 09:32

## 2022-01-05 RX ADMIN — HYDROMORPHONE HYDROCHLORIDE 1 MILLIGRAM(S): 2 INJECTION INTRAMUSCULAR; INTRAVENOUS; SUBCUTANEOUS at 01:22

## 2022-01-05 RX ADMIN — LIDOCAINE 1 PATCH: 4 CREAM TOPICAL at 20:04

## 2022-01-05 RX ADMIN — CARVEDILOL PHOSPHATE 25 MILLIGRAM(S): 80 CAPSULE, EXTENDED RELEASE ORAL at 20:11

## 2022-01-05 RX ADMIN — HYDROMORPHONE HYDROCHLORIDE 1 MILLIGRAM(S): 2 INJECTION INTRAMUSCULAR; INTRAVENOUS; SUBCUTANEOUS at 05:20

## 2022-01-05 RX ADMIN — Medication 81 MILLIGRAM(S): at 09:32

## 2022-01-05 RX ADMIN — LIDOCAINE 1 PATCH: 4 CREAM TOPICAL at 09:33

## 2022-01-05 RX ADMIN — HYDROMORPHONE HYDROCHLORIDE 1 MILLIGRAM(S): 2 INJECTION INTRAMUSCULAR; INTRAVENOUS; SUBCUTANEOUS at 01:07

## 2022-01-05 RX ADMIN — Medication 10 MILLIGRAM(S): at 00:32

## 2022-01-05 RX ADMIN — HYDROMORPHONE HYDROCHLORIDE 1 MILLIGRAM(S): 2 INJECTION INTRAMUSCULAR; INTRAVENOUS; SUBCUTANEOUS at 10:03

## 2022-01-05 RX ADMIN — HYDROMORPHONE HYDROCHLORIDE 1 MILLIGRAM(S): 2 INJECTION INTRAMUSCULAR; INTRAVENOUS; SUBCUTANEOUS at 15:53

## 2022-01-05 RX ADMIN — HYDROMORPHONE HYDROCHLORIDE 1 MILLIGRAM(S): 2 INJECTION INTRAMUSCULAR; INTRAVENOUS; SUBCUTANEOUS at 14:53

## 2022-01-05 NOTE — PROGRESS NOTE ADULT - SUBJECTIVE AND OBJECTIVE BOX
c/c: back pain    HPI:  HPI: The patient is a 65 yo female with PMHx. of chronic pain on pain management with Dr. Pagan, LS stenosis, Low back pain, Morbid  Obesity s/p gastric sleeve, CAD s/p MI, CABG x2  in  done in [place], AWAIS,  RBBB, insomnia, who presented in the ER via EMS after she fell in the [facility] parking lot and hit the back or her head. She denies LOC. She states she fell multiple times in the past month, her legs just give out. She had neurology evaluation at [facility] and she was referred to ortho spine.   She denies HA, Dizziness, CP.   She was admitted with Acute L1 fracture. found to be covid positive and with mildly elevated troponins.   No sob or chest pain. no n/v/d.     ROS: all 10 systems reviewed and is as above otherwise negative.     Vital Signs Last 24 Hrs  T(C): 36.6 (2022 08:03), Max: 36.8 (2022 01:01)  T(F): 97.8 (2022 08:03), Max: 98.2 (2022 01:01)  HR: 73 (2022 08:03) (51 - 74)  RR: 18 (2022 08:03) (17 - 18)  SpO2: 95% (2022 08:03) (95% - 100%)      PHYSICAL EXAM:    GENERAL: Comfortable, no acute distress  HEAD:  Atraumatic, Normocephalic  EYES: EOMI, PERRLA  HEENT: dry mucous membranes  NECK: Supple, No JVD  NERVOUS SYSTEM:  Alert & Oriented X3, Motor Strength 5/5 B/L upper and lower extremities  CHEST/LUNG: decreased bs bilaterally.  HEART: Regular rate and rhythm;  ABDOMEN: Soft, Nontender, Nondistended; Bowel sounds present  GENITOURINARY- Voiding, no palpable bladder  EXTREMITIES:  No clubbing, cyanosis, or edema  MUSCULOSKELETAL- tenderness to lower back midline.  SKIN-no rash        LABS:                        13.6   12.30 )-----------( 222      ( 2022 12:45 )             42.1     -05    137  |  109<H>  |  23  ----------------------------<  84  3.7   |  24  |  0.65    Ca    8.5      2022 12:45  Phos  2.6     01-05  Mg     1.9     -05    TPro  6.7  /  Alb  3.1<L>  /  TBili  0.6  /  DBili  x   /  AST  21  /  ALT  25  /  AlkPhos  77  01-04      Urinalysis Basic - ( 2022 00:21 )    Color: Yellow / Appearance: Clear / S.015 / pH: x  Gluc: x / Ketone: Negative  / Bili: Negative / Urobili: Negative mg/dL   Blood: x / Protein: 15 mg/dL / Nitrite: Negative   Leuk Esterase: Trace / RBC: 0-2 /HPF / WBC 3-5   Sq Epi: x / Non Sq Epi: Occasional / Bacteria: Few      MEDS  ascorbic acid 500 milliGRAM(s) Oral daily  aspirin enteric coated 81 milliGRAM(s) Oral daily  atorvastatin 80 milliGRAM(s) Oral at bedtime  carvedilol 25 milliGRAM(s) Oral every 12 hours  enalapril 20 milliGRAM(s) Oral daily  enoxaparin Injectable 40 milliGRAM(s) SubCutaneous daily  escitalopram 20 milliGRAM(s) Oral daily  HYDROmorphone  Injectable 1 milliGRAM(s) IV Push every 4 hours PRN  lidocaine   4% Patch 1 Patch Transdermal daily  temazepam 15 milliGRAM(s) Oral at bedtime PRN    ASSESSMENT AND PLAN:    1. FALL/ACUTE L1 FRACTURE:  -neurosx consult appreciated.   -pain control  -brace ordered  -physical therapy  -pain control.  -change flexeril to prn.    2. Orthostatic by history:  -IVF for dehydration , then check orthostatic vitals if able.   -unlikely syncope per d/w cardiology     3. h/o CAD/CABG/Mildly elevated troponins:  -unlikely acs.   -continue asa/statin/bb    4. HTN:  -continue bb, ace-i for now.     5. Covid+:  -asymptomatic.   -on isolation.     6. DVT px:  -lovenox.

## 2022-01-05 NOTE — DIETITIAN INITIAL EVALUATION ADULT. - PERTINENT MEDS FT
MEDICATIONS  (STANDING):  ascorbic acid 500 milliGRAM(s) Oral daily  aspirin enteric coated 81 milliGRAM(s) Oral daily  atorvastatin 80 milliGRAM(s) Oral at bedtime  carvedilol 25 milliGRAM(s) Oral every 12 hours  cyclobenzaprine 5 milliGRAM(s) Oral daily  enalapril 20 milliGRAM(s) Oral daily  enoxaparin Injectable 40 milliGRAM(s) SubCutaneous daily  escitalopram 20 milliGRAM(s) Oral daily  lidocaine   4% Patch 1 Patch Transdermal daily    MEDICATIONS  (PRN):  HYDROmorphone  Injectable 1 milliGRAM(s) IV Push every 4 hours PRN Severe Pain (7 - 10)  temazepam 15 milliGRAM(s) Oral at bedtime PRN Insomnia

## 2022-01-05 NOTE — CONSULT NOTE ADULT - SUBJECTIVE AND OBJECTIVE BOX
HPI: The patient is a 65 yo female with PMHx. of   chronic pain on pain management with Dr. Pagan, LS stenosis, Low back pain, Morbid  Obestiy s/p gastric sleeve, CAD s/p MI, CABG x2  in 2009 done in [place], AWAIS,  RBBB, insomnia, who presented in the ER via EMS after she fell in the [facility] parking lot and hit the back or her head. She denies LOC. She states she fell multiple times in the past month, her legs just give out. She had neurology evaluation at [facility] and he was referred to ortho spine [facility]  She denies HA, Dizziness, CP.   patient states that the lower back pain does not radiate down her legs and there is no associated new weakness, numbness to extremities. patient denies bowel or bladder dysfunction  Work up with CT of lumbar spine revealed L 1 compression fracture    PMHx: Chronic pain on pain management with Dr. Pagan, LS stenosis, Low back pain, Morbid  Obestiy s/p gastric sleeve, CAD s/p MI, CABG x2  in 2009 done in [place], AWAIS,  RBBB    PSHx: R TKR,  CABG x2 2009,  C sec., Cholecystectomy, Sleeve Gastrectomy in 2016, L shoulder replacement 2010, LLL lobectomy at age 7.    Home Medications:  ascorbic acid 500 mg oral tablet: 1 tab(s) orally once a day (04 Jan 2022 17:51)  Aspirin Enteric Coated 81 mg oral delayed release tablet: 1 tab(s) orally once a day (04 Jan 2022 17:51)  atorvastatin 80 mg oral tablet: 1 tab(s) orally once a day (04 Jan 2022 17:51)  azithromycin 500 mg oral tablet: 1 tab(s) orally once a day x 6 days  ***Course Complete*** (04 Jan 2022 17:51)  carvedilol 25 mg oral tablet: 1 tab(s) orally 2 times a day (04 Jan 2022 17:51)  dexamethasone 6 mg oral tablet: 1 tab(s) orally once a day  ***Course Complete*** (04 Jan 2022 17:51)  enalapril 20 mg oral tablet: 1 tab(s) orally once a day (04 Jan 2022 17:51)  escitalopram 20 mg oral tablet: 1 tab(s) orally once a day (04 Jan 2022 17:51)  hydrocodone-acetaminophen 10 mg-325 mg oral tablet: 1 tab(s) orally 1 to 2 times a day (04 Jan 2022 17:51)  lidocaine 5% patch: Apply 1 patch topically to lower back once daily (04 Jan 2022 17:51)  temazepam 15 mg oral capsule: 1 cap(s) orally once a day (at bedtime) (04 Jan 2022 17:51)  tiZANidine 4 mg oral tablet: 1 tab(s) orally 3 times a day, As Needed (04 Jan 2022 17:51)  Zinc 140 mg (as elemental zinc 50 mg) oral tablet: 1 tab(s) orally once a day (04 Jan 2022 17:51)                        14.3   16.57 )-----------( 259      ( 04 Jan 2022 13:09 )             44.0   01-04    141  |  107  |  33<H>  ----------------------------<  84  3.4<L>   |  29  |  1.02    Ca    8.8      04 Jan 2022 13:09  Mg     1.9     01-04    TPro  6.7  /  Alb  3.1<L>  /  TBili  0.6  /  DBili  x   /  AST  21  /  ALT  25  /  AlkPhos  77  01-04  < from: CT Lumbar Spine No Cont (01.04.22 @ 12:59) >  ACC: [id number] EXAM:  CT LUMBAR SPINE                          PROCEDURE DATE:  01/04/2022      INTERPRETATION:  CT LUMBAR SPINE    CLINICAL INFORMATION: fall, pain    TECHNIQUE:  Noncontrast CT.  Axial acquisition. Sagittal and coronal reformations.    FINDINGS:  FRACTURES:  *  There is a moderate acute to subacute compression fracture involving   the superior endplate of L1. Fractures extend to the spinal canal. There   is mild retropulsion along the right side towards the superior endplate.   Residual anterior to posterior dimension of the spinal canal measures 1.1   cm.  *  No additional fractures seen.  ALIGNMENT:  *  No subluxation.  OTHER:  *  Small endplate osteophytes at L1-2, L2-3, L3-4, and L4-5.  *  Mild narrowing of the L3-4disc space.  *  Mild disc bulge L3-4 with mild to moderate facet degenerative changes   results in mild to moderate canal and bilateral neural foramina narrowing  *  Mild disc bulge L4-5 with moderate facet degenerative changes results   in mild canal, mild to moderate lateral recess, and moderate bilateral   neural foramina narrowing.  *  Moderate facet degenerative changes L5-S1 without evidence of a focal   disc protrusion. Neural foramina and spinal canal appear patent    IMPRESSION:  Moderate acute to subacute compression fracture superior endplate of L1.   Mild retropulsion along the right side towards the superior endplate.   Residual anterior to posterior dimension of the spinal canal measures 1.1   cm.    --- End of Report ---    ROSEMARY CLINTON MD; Attending Radiologist  This document has been electronically signed. Jan 4 2022  1:12PM    < end of copied text >    Social History:  denies  FAMILY HISTORY:  Family history of heart disease (Father, Mother)  Family history of stroke (Mother)    Physical Exam:  Constitutional: Awake / alert  HEENT: PERRLA, EOMI  Neck: Supple  Respiratory: Breath sounds are clear bilaterally  Cardiovascular: S1 and S2, regular rhythm  Gastrointestinal: Soft, NT/ND  Extremities:  no edema  Vascular: No carotid Bruit  Musculoskeletal: no joint swelling/tenderness, no abnormal movements  Skin: No rashes    Neurological Exam:  HF: A x O x 3, appropriately interactive, normal affect, speech fluent, no aphasia or paraphasic errors. Naming /repetition intact   CN: PERRL, EOMI, VFF, facial sensation normal, no NLFD, tongue midline  Motor: No pronator drift, Strength 5/5 in all 4 ext, normal bulk and tone, no tremor, rigidity or bradykinesia  Sens: Intact to light touch  Reflexes: Symmetric and normal, downgoing toes b/l, lE clonus  Coord:  No FNFA, dysmetria, DEBI intact   Gait/Balance: Cannot test  
Patient is a 66y old  Female who presents with a chief complaint of Acute L1 fracture, syncope (2022 16:03)    ________________________________  VERONICA FERNANDEZ is a 66y year old Female with a past medical history of coronary disease status post CABG in  with a LIMA to the LAD and saphenous vein graft to the first diagonal branch, morbid obesity status post gastric bypass surgery, hypertension, hyperlipidemia, sleep apnea on CPAP, and chronic pain.    She underwent coronary angiogram in 2021 which revealed 70% stenosis of the LAD which was filled by the LIMA and the saphenous vein graft to the first diagonal branch was patent.  The right coronary artery and circumflex was angiographically normal.    Given her history of recurrent falls, she underwent a 30-day event monitor which showed sinus rhythm with PVCs.    Echocardiogram from 2012 showed preserved LVEF with a moderately dilated left atrium and mild to moderate mitral valve regurgitation.  Carotid Doppler previously done showed nonobstructive disease.    She now presents status post fall.  Patient had one fall at home, and when she was walking in the [employer] parking lot, she states that she noted her legs gave out and fell backwards hitting her head.  She did not recall the full events of the fall after she she hit her head.  She has had several falls in the past after discussing with her son.  She recently had a syncopal events within the last 2 months in [place].  This was due to low blood pressure reading in the setting of being on narcotics and antihypertensive therapy per report.  I do not have those records.    ________________________________  Review of systems: A 10 point review of system has been performed, and is negative except for what has been mentioned in the above history of present illness.     PAST MEDICAL & SURGICAL HISTORY:  Myocardial infarction  2009    Hypertension    Hyperlipidemia    Sleep apnea  resolved with gastric sleeve surgery    Spinal stenosis    Asthma    Morbid obesity    Osteoarthritis    Knee pain, right    Insomnia    Pain management    Back pain    Right bundle branch block    S/P CABG x 2      S/P shoulder replacement, left      S/P cholecystectomy      Status post lung surgery  age 6 left upper lobectomy    S/P   2980,     S/P laparoscopic sleeve gastrectomy  2016    S/P colonoscopy    History of esophagogastroduodenoscopy (EGD)      FAMILY HISTORY:  Family history of heart disease (Father, Mother)    Family history of stroke (Mother)       The patient denies any history of premature CAD or sudden cardiac death.    SOCIAL HISTORY: The patient denies any history of tobacco abuse, alcohol abuse or illicit drug use.    ALLERGIES:  No Known Allergies    Home Medications:  ascorbic acid 500 mg oral tablet: 1 tab(s) orally once a day (2022 17:51)  Aspirin Enteric Coated 81 mg oral delayed release tablet: 1 tab(s) orally once a day (2022 17:51)  atorvastatin 80 mg oral tablet: 1 tab(s) orally once a day (2022 17:51)  azithromycin 500 mg oral tablet: 1 tab(s) orally once a day x 6 days  ***Course Complete*** (2022 17:51)  carvedilol 25 mg oral tablet: 1 tab(s) orally 2 times a day (2022 17:51)  dexamethasone 6 mg oral tablet: 1 tab(s) orally once a day  ***Course Complete*** (2022 17:51)  enalapril 20 mg oral tablet: 1 tab(s) orally once a day (2022 17:51)  escitalopram 20 mg oral tablet: 1 tab(s) orally once a day (2022 17:51)  hydrocodone-acetaminophen 10 mg-325 mg oral tablet: 1 tab(s) orally 1 to 2 times a day (2022 17:51)  lidocaine 5% patch: Apply 1 patch topically to lower back once daily (2022 17:51)  temazepam 15 mg oral capsule: 1 cap(s) orally once a day (at bedtime) (2022 17:51)  tiZANidine 4 mg oral tablet: 1 tab(s) orally 3 times a day, As Needed (2022 17:51)  Zinc 140 mg (as elemental zinc 50 mg) oral tablet: 1 tab(s) orally once a day (2022 17:51)    MEDICATIONS  (STANDING):  ascorbic acid 500 milliGRAM(s) Oral daily  aspirin enteric coated 81 milliGRAM(s) Oral daily  atorvastatin 80 milliGRAM(s) Oral at bedtime  carvedilol 25 milliGRAM(s) Oral every 12 hours  enalapril 20 milliGRAM(s) Oral daily  enoxaparin Injectable 40 milliGRAM(s) SubCutaneous daily  escitalopram 20 milliGRAM(s) Oral daily  lidocaine   4% Patch 1 Patch Transdermal daily  sodium chloride 0.9%. 1000 milliLiter(s) (125 mL/Hr) IV Continuous <Continuous>    MEDICATIONS  (PRN):  cyclobenzaprine 5 milliGRAM(s) Oral three times a day PRN Muscle Spasm  HYDROmorphone  Injectable 1 milliGRAM(s) IV Push every 4 hours PRN Severe Pain (7 - 10)  temazepam 15 milliGRAM(s) Oral at bedtime PRN Insomnia    Vital Signs Last 24 Hrs  T(C): 36.6 (2022 08:03), Max: 36.8 (2022 01:01)  T(F): 97.8 (2022 08:03), Max: 98.2 (2022 01:01)  HR: 73 (2022 08:03) (51 - 74)  BP: 159/66 (2022 08:03) (133/70 - 186/101)  BP(mean): --  RR: 18 (2022 08:03) (17 - 18)  SpO2: 95% (2022 08:03) (95% - 100%)  I&O's Summary    ________________________________  GENERAL APPEARANCE:  No acute distress  HEAD: normocephalic, atraumatic  NECK: supple, no jugular venous distention, no carotid bruit    HEART: Regular rate and rhythm, S1, S2 normal, distant heart sounds    CHEST:  No anterior chest wall tenderness    LUNGS:  Clear to auscultation, without any wheezing, rhonchi or rales    ABDOMEN: soft, nontender, nondistended, with positive bowel sounds appreciated  EXTREMITIES: no edema.   NEURO: Alert and oriented x3  PSYC:  Normal affect  SKIN:  Dry  ________________________________   TELEMETRY: Sinus rhythm with PVCs.  No significant arrhythmias noted.  ECG: Sinus rhythm with first-degree AV block and right bundle branch block at 126 ms.  Nonspecific changes.  LABS:                        13.6   12.30 )-----------( 222      ( 2022 12:45 )             42.1                 137  |  109<H>  |  23  ----------------------------<  84  3.7   |  24  |  0.65    Ca    8.5      2022 12:45  Phos  2.6     -  Mg     1.9         TPro  6.7  /  Alb  3.1<L>  /  TBili  0.6  /  DBili  x   /  AST  21  /  ALT  25  /  AlkPhos  77        LIVER FUNCTIONS - ( 2022 13:09 )  Alb: 3.1 g/dL / Pro: 6.7 gm/dL / ALK PHOS: 77 U/L / ALT: 25 U/L / AST: 21 U/L / GGT: x           Urinalysis Basic - ( 2022 00:21 )    Color: Yellow / Appearance: Clear / S.015 / pH: x  Gluc: x / Ketone: Negative  / Bili: Negative / Urobili: Negative mg/dL   Blood: x / Protein: 15 mg/dL / Nitrite: Negative   Leuk Esterase: Trace / RBC: 0-2 /HPF / WBC 3-5   Sq Epi: x / Non Sq Epi: Occasional / Bacteria: Few      Pro BNP  --  @ 14:28  D Dimer  759  @ 14:28      Urinalysis Basic - ( 2022 00:21 )    Color: Yellow / Appearance: Clear / S.015 / pH: x  Gluc: x / Ketone: Negative  / Bili: Negative / Urobili: Negative mg/dL   Blood: x / Protein: 15 mg/dL / Nitrite: Negative   Leuk Esterase: Trace / RBC: 0-2 /HPF / WBC 3-5   Sq Epi: x / Non Sq Epi: Occasional / Bacteria: Few             ________________________________    RADIOLOGY & ADDITIONAL STUDIES:   ________________________________    ASSESSMENT:  Status post fall, rule out syncope with L1 fracture  Coronary disease status post CABG-LIMA to LAD and saphenous vein graft to first diagonal branch  History of PVCs  Mild to moderate mitral valve regurgitation  Carotid atherosclerosis without any significant stenosis  Status post fall, unclear syncope  Morbid obesity  Hypertension  Chronic pain    PLAN:  In summary, this is a 66y Female with a past medical history of coronary disease status post two-vessel CABG with coronary graft monitor showing patent grafts and patent native circumflex and right coronary artery, mild to moderate mitral valve regurgitation, who recently underwent a 30-day event monitor showing PVCs.  She presents status post fall after her legs gave out.  No significant arrhythmias noted on telemetry.  No need for repeat echo.  Continue aspirin due to history of CAD.  Continue statin.  Continue beta-blocker for history of PVCs and hypertension.  Continue enalapril.  Once brace is in place for L1 fracture, check orthostatic vital signs.    Discussed with son.    __________________________________________________________________________  Thank you for allowing me to participate in the care of your patient. Please contact me should any questions arise.    RAZA Sullivan DO, [facility]  [phone]

## 2022-01-05 NOTE — CONSULT NOTE ADULT - ASSESSMENT
AP  L1 compression fracture  recommend multi modal pain regimen, will order TLSO    patient dw Dr Miller

## 2022-01-05 NOTE — PATIENT PROFILE ADULT - FALL HARM RISK - HARM RISK INTERVENTIONS
Assistance with ambulation/Assistance OOB with selected safe patient handling equipment/Communicate Risk of Fall with Harm to all staff/Discuss with provider need for PT consult/Monitor gait and stability/Reinforce activity limits and safety measures with patient and family/Tailored Fall Risk Interventions/Visual Cue: Yellow wristband and red socks/Bed in lowest position, wheels locked, appropriate side rails in place/Call bell, personal items and telephone in reach/Instruct patient to call for assistance before getting out of bed or chair/Non-slip footwear when patient is out of bed/Mize to call system/Physically safe environment - no spills, clutter or unnecessary equipment/Purposeful Proactive Rounding/Room/bathroom lighting operational, light cord in reach

## 2022-01-05 NOTE — DIETITIAN INITIAL EVALUATION ADULT. - WEIGHT IN KG
PROCEDURE NOTE    Emmanuel More   [id number]  1936      Procedure:  Colonoscopy, biopsy, snare polypectomy    Pre Procedure Diagnosis:  Ulcerative Colitis, surveillance.    Post Procedure Diagnosis:  Pandiverticulosis, Ulcerative Colitis in Remission,  2 ascending colon polyp snared.    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  His last colonoscopy was on 11/2015.  He is due for surveillance.  See my 3/13/19 note for details of his HPI.    Instrument:  Olympus  High Definition    Anesthesia:  Monitored anesthesia care.  See anesthesia record.    Description of Procedure:  After Informed Consent, the patient was placed in the left lateral decubitus position.  Anesthesia  was given.  Rectal exam revealed good sphincter tone and no abnormalities.  The prostate was noraml..  The scope was passed and advanced into the cecal cap where the appendiceal orifice was identified and photographed.  The preparation was good. Methylene blue was used intraluminally.   The cecum and ileocecal valve were normal.  The last 5 cm of ileum was normal.  The ascending, transverse, descending and sigmoid colon had pandiverticulosis (mild on the Right side, moderate on the Left side.  2 small ascending colon polyps were snared.  No granularity, erosions, ulcers or edema were seen.      The rectum was normal on direct and retroflexed view.  Scope withdrawal time was more than six minutes.  The patient tolerated the procedure well.    Specimens:  A: random R colon  B: ascending colon polyps x 2  C: random L colon    Estimated blood loss:  <10 cc    Complications:  None immediate    Scope Events:   Scope In: 0802    At Cecum: 0809   Scope Out: 0821   Scope Withdrawal Time: 12              Recommendations:  Await pathology.  If no dysplasia or villous change, no further screening.  Will call/write/email with path results and recommendations.      CC:  Dr. Paolo Johnson        
124

## 2022-01-05 NOTE — DIETITIAN INITIAL EVALUATION ADULT. - ORAL INTAKE PTA/DIET HISTORY
Pt reports very poor PO intake over the past 10 days. Pt states she has had Covid and had very little appetite during that time. Per pt's report she completed <75% of NN over 10 days. Pt more acutely is having issues eating 2/2 to back pain. Pt cannot position herself well when eating and is having some difficulty swallowing. RD discussed some simple items to eat in meantime. Will add protein supplement to help improve protein intake.

## 2022-01-05 NOTE — DIETITIAN INITIAL EVALUATION ADULT. - MALNUTRITION
Pt may meet criteria for mild or moderate protein calorie malnutrition (no weight h/x), recommend monitoring weight to assess for weight changes

## 2022-01-05 NOTE — DIETITIAN INITIAL EVALUATION ADULT. - OTHER INFO
HPI: The patient is a 67 yo female with PMHx. of   chronic pain on pain management with Dr. Pagan, LS stenosis, Low back pain, Morbid  Obestiy s/p gastric sleeve, CAD s/p MI, CABG x2  in 2009 done in [place], AWAIS,  RBBB, insomnia, who presented in the ER via EMS after she fell in the [facility] parking lot and hit the back or her head. She denies LOC. She states she fell multiple times in the past month, her legs just give out. She had neurology evaluation at [facility] and he was referred to ortho spine.   She denies HA, Dizziness, CP.     Pt seen for assessment. Per pt she hasn't been eating well and difficulty positioning self in bed 2/2 to back pain (see above). Pt denies issues with chewing, but had some issues swallowing 2/2 to position in bed. Pt denies n/v/d/c. Pt denies food allergies. Pt agreeable to starting HPI: The patient is a 65 yo female with PMHx. of   chronic pain on pain management with Dr. Pagan, LS stenosis, Low back pain, Morbid  Obestiy s/p gastric sleeve, CAD s/p MI, CABG x2  in 2009 done in [place], AWAIS,  RBBB, insomnia, who presented in the ER via EMS after she fell in the [facility] parking lot and hit the back or her head. She denies LOC. She states she fell multiple times in the past month, her legs just give out. She had neurology evaluation at [facility] and he was referred to ortho spine.   She denies HA, Dizziness, CP.     Pt seen for assessment. Per pt she hasn't been eating well and difficulty positioning self in bed 2/2 to back pain (see above). Pt denies issues with chewing, but had some issues swallowing 2/2 to position in bed. Pt denies n/v/d/c. Pt denies food allergies. Pt agreeable to starting protein supplement to help meet needs while having issues with intake. Recommend ensure Max TID to help meet protein needs. Reviewed items for patient to eat.

## 2022-01-05 NOTE — CHART NOTE - NSCHARTNOTEFT_GEN_A_CORE
Patient was dispensed a TLSO rigid posterior panel extending from sacrococcygeal junction to scapular spine with a soft apron front. Tierra was educated on the care use and function of the orthosis. All went without incident.   Kannan Arz[facility]  [phone]

## 2022-01-05 NOTE — DIETITIAN INITIAL EVALUATION ADULT. - PERTINENT LABORATORY DATA
01-04    141  |  107  |  33<H>  ----------------------------<  84  3.4<L>   |  29  |  1.02    Ca    8.8      04 Jan 2022 13:09  Mg     1.9     01-04    TPro  6.7  /  Alb  3.1<L>  /  TBili  0.6  /  DBili  x   /  AST  21  /  ALT  25  /  AlkPhos  77  01-04

## 2022-01-06 ENCOUNTER — TRANSCRIPTION ENCOUNTER (OUTPATIENT)
Age: 67
End: 2022-01-06

## 2022-01-06 VITALS
OXYGEN SATURATION: 97 % | TEMPERATURE: 98 F | RESPIRATION RATE: 18 BRPM | SYSTOLIC BLOOD PRESSURE: 163 MMHG | DIASTOLIC BLOOD PRESSURE: 83 MMHG

## 2022-01-06 LAB
ANION GAP SERPL CALC-SCNC: 4 MMOL/L — LOW (ref 5–17)
BUN SERPL-MCNC: 19 MG/DL — SIGNIFICANT CHANGE UP (ref 7–23)
CALCIUM SERPL-MCNC: 8.4 MG/DL — LOW (ref 8.5–10.1)
CHLORIDE SERPL-SCNC: 106 MMOL/L — SIGNIFICANT CHANGE UP (ref 96–108)
CO2 SERPL-SCNC: 29 MMOL/L — SIGNIFICANT CHANGE UP (ref 22–31)
CREAT SERPL-MCNC: 0.67 MG/DL — SIGNIFICANT CHANGE UP (ref 0.5–1.3)
CULTURE RESULTS: SIGNIFICANT CHANGE UP
GLUCOSE SERPL-MCNC: 83 MG/DL — SIGNIFICANT CHANGE UP (ref 70–99)
MAGNESIUM SERPL-MCNC: 1.7 MG/DL — SIGNIFICANT CHANGE UP (ref 1.6–2.6)
PHOSPHATE SERPL-MCNC: 2.8 MG/DL — SIGNIFICANT CHANGE UP (ref 2.5–4.5)
POTASSIUM SERPL-MCNC: 3.7 MMOL/L — SIGNIFICANT CHANGE UP (ref 3.5–5.3)
POTASSIUM SERPL-SCNC: 3.7 MMOL/L — SIGNIFICANT CHANGE UP (ref 3.5–5.3)
SODIUM SERPL-SCNC: 139 MMOL/L — SIGNIFICANT CHANGE UP (ref 135–145)
SPECIMEN SOURCE: SIGNIFICANT CHANGE UP

## 2022-01-06 PROCEDURE — 99239 HOSP IP/OBS DSCHRG MGMT >30: CPT

## 2022-01-06 RX ORDER — LIDOCAINE 4 G/100G
0 CREAM TOPICAL
Qty: 0 | Refills: 0 | DISCHARGE

## 2022-01-06 RX ADMIN — LIDOCAINE 1 PATCH: 4 CREAM TOPICAL at 10:27

## 2022-01-06 RX ADMIN — Medication 81 MILLIGRAM(S): at 10:28

## 2022-01-06 RX ADMIN — Medication 500 MILLIGRAM(S): at 10:28

## 2022-01-06 RX ADMIN — Medication 20 MILLIGRAM(S): at 10:28

## 2022-01-06 RX ADMIN — ESCITALOPRAM OXALATE 20 MILLIGRAM(S): 10 TABLET, FILM COATED ORAL at 10:28

## 2022-01-06 RX ADMIN — ENOXAPARIN SODIUM 40 MILLIGRAM(S): 100 INJECTION SUBCUTANEOUS at 10:29

## 2022-01-06 RX ADMIN — HYDROMORPHONE HYDROCHLORIDE 1 MILLIGRAM(S): 2 INJECTION INTRAMUSCULAR; INTRAVENOUS; SUBCUTANEOUS at 02:12

## 2022-01-06 RX ADMIN — CARVEDILOL PHOSPHATE 25 MILLIGRAM(S): 80 CAPSULE, EXTENDED RELEASE ORAL at 10:28

## 2022-01-06 NOTE — DISCHARGE NOTE NURSING/CASE MANAGEMENT/SOCIAL WORK - NSDCPEFALRISK_GEN_ALL_CORE
For information on Fall & Injury Prevention, visit: [email or web address] OR  [email or web address] OR  https://www.cdc.gov/steadi/patient.html

## 2022-01-06 NOTE — PHYSICAL THERAPY INITIAL EVALUATION ADULT - MODALITIES TREATMENT COMMENTS
Pt left as rec'd supine in bed at pt's request with HM and Bladder Suction both intact; CBIR; RN Nancy charles

## 2022-01-06 NOTE — DISCHARGE NOTE PROVIDER - PROVIDER TOKENS
PROVIDER:[TOKEN:[[id number]],FOLLOWUP:[2 weeks]] PROVIDER:[TOKEN:[[id number]],FOLLOWUP:[2 weeks]],PROVIDER:[TOKEN:[[id number]],FOLLOWUP:[2 weeks]]

## 2022-01-06 NOTE — PHYSICAL THERAPY INITIAL EVALUATION ADULT - PLANNED THERAPY INTERVENTIONS, PT EVAL
cranioplasty NO Further Acute Care Physical Therapy Needs at this time as pt is independent in all functional mobility. Will d/c PT at this time. NOBLE Cruz made aware and in agreement with plan

## 2022-01-06 NOTE — PROGRESS NOTE ADULT - SUBJECTIVE AND OBJECTIVE BOX
The patient was seen and examined.  No significant arrhythmias noted on telemetry.  No dizziness or lightheadedness.  No chest discomfort or shortness of breath.    Initial Consult HPI    ________________________________  VERONICA FERNANDEZ is a 66y year old Female with a past medical history of coronary disease status post CABG in  with a LIMA to the LAD and saphenous vein graft to the first diagonal branch, morbid obesity status post gastric bypass surgery, hypertension, hyperlipidemia, sleep apnea on CPAP, and chronic pain.    She underwent coronary angiogram in 2021 which revealed 70% stenosis of the LAD which was filled by the LIMA and the saphenous vein graft to the first diagonal branch was patent.  The right coronary artery and circumflex was angiographically normal.    Given her history of recurrent falls, she underwent a 30-day event monitor which showed sinus rhythm with PVCs.    Echocardiogram from 2012 showed preserved LVEF with a moderately dilated left atrium and mild to moderate mitral valve regurgitation.  Carotid Doppler previously done showed nonobstructive disease.    She now presents status post fall.  Patient had one fall at home, and when she was walking in the [employer] parking lot, she states that she noted her legs gave out and fell backwards hitting her head.  She did not recall the full events of the fall after she she hit her head.  She has had several falls in the past after discussing with her son.  She recently had a syncopal events within the last 2 months in [place].  This was due to low blood pressure reading in the setting of being on narcotics and antihypertensive therapy per report.  I do not have those records.    ________________________________  Review of systems: A 10 point review of system has been performed, and is negative except for what has been mentioned in the above history of present illness.     PAST MEDICAL & SURGICAL HISTORY:  Myocardial infarction  2009    Hypertension    Hyperlipidemia    Sleep apnea  resolved with gastric sleeve surgery    Spinal stenosis    Asthma    Morbid obesity    Osteoarthritis    Knee pain, right    Insomnia    Pain management    Back pain    Right bundle branch block    S/P CABG x 2      S/P shoulder replacement, left      S/P cholecystectomy      Status post lung surgery  age 6 left upper lobectomy    S/P   2980,     S/P laparoscopic sleeve gastrectomy      S/P colonoscopy    History of esophagogastroduodenoscopy (EGD)      FAMILY HISTORY:  Family history of heart disease (Father, Mother)    Family history of stroke (Mother)       The patient denies any history of premature CAD or sudden cardiac death.    SOCIAL HISTORY: The patient denies any history of tobacco abuse, alcohol abuse or illicit drug use.    ALLERGIES:  No Known Allergies    Home Medications:  ascorbic acid 500 mg oral tablet: 1 tab(s) orally once a day (2022 17:51)  Aspirin Enteric Coated 81 mg oral delayed release tablet: 1 tab(s) orally once a day (2022 17:51)  atorvastatin 80 mg oral tablet: 1 tab(s) orally once a day (2022 17:51)  azithromycin 500 mg oral tablet: 1 tab(s) orally once a day x 6 days  ***Course Complete*** (2022 17:51)  carvedilol 25 mg oral tablet: 1 tab(s) orally 2 times a day (2022 17:51)  dexamethasone 6 mg oral tablet: 1 tab(s) orally once a day  ***Course Complete*** (2022 17:51)  enalapril 20 mg oral tablet: 1 tab(s) orally once a day (2022 17:51)  escitalopram 20 mg oral tablet: 1 tab(s) orally once a day (2022 17:51)  hydrocodone-acetaminophen 10 mg-325 mg oral tablet: 1 tab(s) orally 1 to 2 times a day (2022 17:51)  lidocaine 5% patch: Apply 1 patch topically to lower back once daily (2022 17:51)  temazepam 15 mg oral capsule: 1 cap(s) orally once a day (at bedtime) (2022 17:51)  tiZANidine 4 mg oral tablet: 1 tab(s) orally 3 times a day, As Needed (2022 17:51)  Zinc 140 mg (as elemental zinc 50 mg) oral tablet: 1 tab(s) orally once a day (2022 17:51)      ________________________________  GENERAL APPEARANCE:  No acute distress  HEAD: normocephalic, atraumatic  NECK: supple, no jugular venous distention, no carotid bruit    HEART: Regular rate and rhythm, S1, S2 normal, distant heart sounds    CHEST:  No anterior chest wall tenderness    LUNGS:  Clear to auscultation, without any wheezing, rhonchi or rales    ABDOMEN: soft, nontender, nondistended, with positive bowel sounds appreciated  EXTREMITIES: no edema.   NEURO: Alert and oriented x3  PSYC:  Normal affect  SKIN:  Dry  ________________________________   TELEMETRY: Sinus rhythm with PVCs.  No significant arrhythmias noted.  ECG: Sinus rhythm with first-degree AV block and right bundle branch block at 126 ms.  Nonspecific changes.  LABS:                                   13.6   12.30 )-----------( 222      ( 2022 12:45 )             42.1          01-06    139  |  106  |  19  ----------------------------<  83  3.7   |  29  |  0.67    Ca    8.4<L>      2022 06:56  Phos  2.8     01-06  Mg     1.7     -06            Urinalysis Basic - ( 2022 00:21 )    Color: Yellow / Appearance: Clear / S.015 / pH: x  Gluc: x / Ketone: Negative  / Bili: Negative / Urobili: Negative mg/dL   Blood: x / Protein: 15 mg/dL / Nitrite: Negative   Leuk Esterase: Trace / RBC: 0-2 /HPF / WBC 3-5   Sq Epi: x / Non Sq Epi: Occasional / Bacteria: Few         ________________________________    RADIOLOGY & ADDITIONAL STUDIES:   ________________________________    ASSESSMENT:  Status post fall, rule out syncope with L1 fracture  Coronary disease status post CABG-LIMA to LAD and saphenous vein graft to first diagonal branch  History of PVCs  Mild to moderate mitral valve regurgitation  Carotid atherosclerosis without any significant stenosis  Status post fall, unclear syncope  Morbid obesity  Hypertension  Chronic pain    PLAN:  In summary, this is a 66y Female with a past medical history of coronary disease status post two-vessel CABG with coronary graft monitor showing patent grafts and patent native circumflex and right coronary artery, mild to moderate mitral valve regurgitation, who recently underwent a 30-day event monitor showing PVCs.    Once able to stand, check orthostatic vital signs.  Continue with aspirin, statin, beta-blocker and enalapril.  Regarding history of recurrent falls, in setting of history of coronary disease and first-degree AV block with right bundle branch block, recommend loop recorder as outpatient.  Orthopedic surgery following for L1 fracture.  Will be discharged with brace.  __________________________________________________________________________  Thank you for allowing me to participate in the care of your patient. Please contact me should any questions arise.    RAZA Sullivan DO, [facility]  [phone]

## 2022-01-06 NOTE — DISCHARGE NOTE PROVIDER - NSDCMRMEDTOKEN_GEN_ALL_CORE_FT
ascorbic acid 500 mg oral tablet: 1 tab(s) orally once a day  Aspirin Enteric Coated 81 mg oral delayed release tablet: 1 tab(s) orally once a day  atorvastatin 80 mg oral tablet: 1 tab(s) orally once a day  carvedilol 25 mg oral tablet: 1 tab(s) orally 2 times a day  enalapril 20 mg oral tablet: 1 tab(s) orally once a day  escitalopram 20 mg oral tablet: 1 tab(s) orally once a day  hydrocodone-acetaminophen 10 mg-325 mg oral tablet: 1 tab(s) orally 1 to 2 times a day  lidocaine 5% patch: 1 patch transdermal once a day, 12 hrs on 12 hrs off  temazepam 15 mg oral capsule: 1 cap(s) orally once a day (at bedtime)  tiZANidine 4 mg oral tablet: 1 tab(s) orally 3 times a day, As Needed  Zinc 140 mg (as elemental zinc 50 mg) oral tablet: 1 tab(s) orally once a day

## 2022-01-06 NOTE — DISCHARGE NOTE NURSING/CASE MANAGEMENT/SOCIAL WORK - NSDCVIVACCINE_GEN_ALL_CORE_FT
influenza, injectable, quadrivalent, preservative free; 27-Oct-2015 08:17; Laney Johnson (RN); [employer]; [id number]; IntraMuscular; Deltoid Right.; 0.5 milliLiter(s); VIS (VIS Published: 07-Aug-2015, VIS Presented: 27-Oct-2015);   Tdap; 09-Jul-2019 04:46; Vipul Noe (RN); [employer]; [id number] (Exp. Date: 24-Apr-2021); IntraMuscular; Deltoid Left.; 0.5 milliLiter(s); VIS (VIS Published: 09-May-2013, VIS Presented: 09-Jul-2019);

## 2022-01-06 NOTE — DISCHARGE NOTE NURSING/CASE MANAGEMENT/SOCIAL WORK - PATIENT PORTAL LINK FT
You can access the FollowMyHealth Patient Portal offered by [facility] by registering at the following website: [email or web address]. By joining [facility]’s FollowMyHealth portal, you will also be able to view your health information using other applications (apps) compatible with our system.

## 2022-01-06 NOTE — DISCHARGE NOTE PROVIDER - NSDCCPCAREPLAN_GEN_ALL_CORE_FT
PRINCIPAL DISCHARGE DIAGNOSIS  Diagnosis: Traumatic compression fracture of L1 vertebra  Assessment and Plan of Treatment: Appreciate input from Neurosurgery Dr. Miller. Neurologically intact. minimal retropulsion. No signs of cord issue. Advised TLSO brace which has since been applied to patient, multimodal pain regimen suggested, PT evaluation. Patient placed in TSLO, Has been ambulating steadily in the room to and from bathroom, no severe pain complaints, eager to be discharged home today. Neuro cardiac workup unrevealing, orthostatics negative. Stable for DC home. Patient can either follow up with her previously anticipated visit with Ortho Spine or visit with Neurosurgery Dr. Miller.      SECONDARY DISCHARGE DIAGNOSES  Diagnosis: Elevated troponin  Assessment and Plan of Treatment: Likely myocardial demand ischemia in setting of fall. EKG negative for ischemic changes. No chest pain or CHF sx.    Diagnosis: 2019 novel coronavirus disease (COVID-19)  Assessment and Plan of Treatment: No symptoms of acute COVID infection. No hypoxia. No pneumonia on CXR. Supportive care.

## 2022-01-06 NOTE — DISCHARGE NOTE PROVIDER - CARE PROVIDERS DIRECT ADDRESSES
[email or web address]

## 2022-01-06 NOTE — DISCHARGE NOTE PROVIDER - HOSPITAL COURSE
66 year-old woman with morbid obesity s/p sleeve gastrectomy, AWAIS, CAD, hx of MI, hx of CABG, RBBB, chronic back pain, knee replacement, shoulder replacement, presented 1/4 for further evaluation after a fall, possible near syncope, though patient stated that she merely felt her legs give out. She has known lumbosacral stenosis and chronic pain and had received a referral to Ortho Spine but had not yet had her appointment. In the ED she was found to have COVID+ PCR testing, no pneumonia no hypoxia. CT head and C spine negative. CT L spine did reveal an acute to subacute L 1 fracture. Neurosurgery consulted and patient admitted to Medicine.     Physical deconditioning, fall, acute to subacute  L1 fracture  Appreciate input from Neurosurgery Dr. Miller. Neurologically intact. minimal retropulsion. No signs of cord issue. Advised TLSO brace which has since been applied to patient, multimodal pain regimen suggested, PT evaluation. Patient placed in TSLO, Has been ambulating steadily in the room to and from bathroom, no severe pain complaints, eager to be discharged home today. Neuro cardiac workup unrevealing, orthostatics negative. Stable for DC home. Patient can either follow up with her previously anticipated visit with Ortho Spine or visit with Neurosurgery Dr. Miller.     CAD  No angina or CHF symptoms. No lightheadedness or dizziness. No palpitations. No events on tele. Continue aspirin, statin, Coreg.     Chronic back pain  Resume home regimen for pain. Patient follows outpatient with Dr Pagan of Pain Management.     COVID+ PCR  No symptoms of acute COVID infection. No hypoxia. No pneumonia on CXR. Supportive care.       Discharge Exam:  Afebrile  /80  HR 60  RR 18  O2 97% on RA  Comfortable appearing  NCAT PERRL EOMI  Chest CTA B/L  CVS s1 s2 normal RRR  Abd soft NT ND +BS  Ext No calf tenderness  Skin Intact  Neuro A+OX3, CN intact, no gross deficits  Mood Calm and pleasant    Labs:                        13.6   12.30 )-------( 222             42.1       139  |  106  |  19  ----------------------<  83  3.7   |  29  |  0.67    Ca 8.4  Phos 2.8    Mg 1.7     Micro:  COVID19 PCR 1/4: Positive  Flu PCR 1/4: Negative  RSV PCR 1/14: Negative    Imaging:  CT head WO 1/4: No evidence of intracranial hemorrhage. There are minimal periventricular white matter microvascular ischemic changes. No mass or mass effect. No hydrocephalus. No midline shift. No extra-axial mass. Basal cisterns preserved. No depressed calvarial fracture. Mild to moderate mucosal thickening is seen within the right sphenoid sinus.     CT C spine WO 1/4:  No evidence of an acute fracture. Straightening of the normal cervical lordosis. No subluxation. There are mild multilevel degenerative changes.    CT L spine WO 1/4: Moderate acute to subacute compression fracture superior endplate of L1. Mild retropulsion along the right side towards the superior endplate.  Residual anterior to posterior dimension of the spinal canal measures 1.1 cm.    CXR 1/4: Heart magnified by technique. Sternotomy and left shoulder replacement again noted. Lungs remain clear.

## 2022-01-06 NOTE — DISCHARGE NOTE PROVIDER - CARE PROVIDER_API CALL
Alek Miller; PhD)  Neurosurgery  [address], 2nd [place] [address]  Phone: [phone]  Fax: [phone]  Follow Up Time: 2 weeks   Alek Miller; PhD)  Neurosurgery  [address] [facility] [address] [facility] [address] [facility], [address]nd [place] [address]  Phone: [phone]  Fax: [phone]  Follow Up Time: 2 weeks    Deepthi Sullivan (DO; [email or web address])  Cardiology  [address] [place] [address]  Phone: [phone]  Fax: [phone]  Follow Up Time: 2 weeks

## 2022-01-06 NOTE — PHYSICAL THERAPY INITIAL EVALUATION ADULT - GENERAL OBSERVATIONS, REHAB EVAL
Pt rec'd supine in bed in NAD with HM (NSR 62) and Bladder Suction both intact; Pt reported 8/10 back pain

## 2022-01-18 DIAGNOSIS — S32.019A UNSPECIFIED FRACTURE OF FIRST LUMBAR VERTEBRA, INITIAL ENCOUNTER FOR CLOSED FRACTURE: ICD-10-CM

## 2022-01-18 DIAGNOSIS — G47.00 INSOMNIA, UNSPECIFIED: ICD-10-CM

## 2022-01-18 DIAGNOSIS — E87.6 HYPOKALEMIA: ICD-10-CM
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
## 2022-01-18 DIAGNOSIS — Z96.612 PRESENCE OF LEFT ARTIFICIAL SHOULDER JOINT: ICD-10-CM

## 2022-01-18 DIAGNOSIS — Z98.84 BARIATRIC SURGERY STATUS: ICD-10-CM

## 2022-01-18 DIAGNOSIS — R00.1 BRADYCARDIA, UNSPECIFIED: ICD-10-CM

## 2022-01-18 DIAGNOSIS — I65.29 OCCLUSION AND STENOSIS OF UNSPECIFIED CAROTID ARTERY: ICD-10-CM

## 2022-01-18 DIAGNOSIS — U07.1 COVID-19: ICD-10-CM

## 2022-01-18 DIAGNOSIS — G89.4 CHRONIC PAIN SYNDROME: ICD-10-CM
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
## 2022-01-18 DIAGNOSIS — I34.0 NONRHEUMATIC MITRAL (VALVE) INSUFFICIENCY: ICD-10-CM

## 2022-01-18 DIAGNOSIS — M19.90 UNSPECIFIED OSTEOARTHRITIS, UNSPECIFIED SITE: ICD-10-CM

## 2022-01-18 DIAGNOSIS — E66.01 MORBID (SEVERE) OBESITY DUE TO EXCESS CALORIES: ICD-10-CM

## 2022-01-18 DIAGNOSIS — E86.0 DEHYDRATION: ICD-10-CM

## 2022-01-18 DIAGNOSIS — I10 ESSENTIAL (PRIMARY) HYPERTENSION: ICD-10-CM

## 2022-01-18 DIAGNOSIS — Z95.1 PRESENCE OF AORTOCORONARY BYPASS GRAFT: ICD-10-CM

## 2022-01-18 DIAGNOSIS — E78.5 HYPERLIPIDEMIA, UNSPECIFIED: ICD-10-CM

## 2022-01-18 DIAGNOSIS — I25.2 OLD MYOCARDIAL INFARCTION: ICD-10-CM

## 2022-01-18 DIAGNOSIS — Z79.82 LONG TERM (CURRENT) USE OF ASPIRIN: ICD-10-CM

## 2022-01-18 DIAGNOSIS — I49.3 VENTRICULAR PREMATURE DEPOLARIZATION: ICD-10-CM

## 2022-01-18 DIAGNOSIS — I25.10 ATHEROSCLEROTIC HEART DISEASE OF NATIVE CORONARY ARTERY WITHOUT ANGINA PECTORIS: ICD-10-CM

## 2022-01-18 DIAGNOSIS — I42.8 OTHER CARDIOMYOPATHIES: ICD-10-CM

## 2022-01-18 DIAGNOSIS — Z96.659 PRESENCE OF UNSPECIFIED ARTIFICIAL KNEE JOINT: ICD-10-CM

## 2022-01-18 DIAGNOSIS — I45.10 UNSPECIFIED RIGHT BUNDLE-BRANCH BLOCK: ICD-10-CM

## 2022-01-18 DIAGNOSIS — M48.07 SPINAL STENOSIS, LUMBOSACRAL REGION: ICD-10-CM

## 2022-01-19 ENCOUNTER — APPOINTMENT (OUTPATIENT)
Dept: NEUROSURGERY | Facility: CLINIC | Age: 67
End: 2022-01-19
Payer: COMMERCIAL

## 2022-01-19 VITALS
HEART RATE: 58 BPM | WEIGHT: 245 LBS | HEIGHT: 64 IN | OXYGEN SATURATION: 95 % | BODY MASS INDEX: 41.83 KG/M2 | TEMPERATURE: 96.8 F

## 2022-01-19 PROCEDURE — 99214 OFFICE O/P EST MOD 30 MIN: CPT

## 2022-01-19 RX ORDER — LIDOCAINE 40 MG/G
4 PATCH TOPICAL
Qty: 24 | Refills: 0 | Status: ACTIVE | COMMUNITY
Start: 2022-01-19 | End: 1900-01-01

## 2022-01-19 NOTE — REVIEW OF SYSTEMS
[Feeling Tired] : feeling tired [Depression] : depression [Shortness Of Breath] : shortness of breath [Wheezing] : wheezing [Cough] : cough [Joint Pain] : joint pain [Negative] : Heme/Lymph [de-identified] : Headaches [FreeTextEntry9] : Muscle Pain

## 2022-01-19 NOTE — CONSULT LETTER
[Dear  ___] : Dear  [unfilled], [Courtesy Letter:] : I had the pleasure of seeing your patient, [unfilled], in my office today. [Sincerely,] : Sincerely, [FreeTextEntry2] : Baldomero Ingram MD\par [address]  [address] \[place] [address] [FreeTextEntry1] : Mrs. Johansen Is a very pleasant 66-year-old female patient who was seen in our office today in follow-up in regards to an L1 compression fracture.\par \par Briefly, the patient suffered a slip and fall on January 4, 2022.  The patient has minimal recollection of the event and suffered a minor concussion as well.  Subsequent CT scans demonstrated a new L1 compression fracture and the patient was placed in a brace.  The patient presents to our office today in 2-week follow-up.  At this time, the patient continues to have quite severe pain in the low back rated at 10/10.  The patient's pain does not radiate beyond the low back.  The patient endorses some numbness and tingling around the back, but does not endorse any symptoms radiating into the leg.  The patient does have a right-sided knee replacement approximately 3 years ago and states that she has been having difficulty with falls even before this most recent one.  Patient states that her falls are secondary to her "right leg giving out".  The patient states that she has no symptoms on the left.\par \par The patient endorses a past medical history significant for hypertension, osteoporosis, and anxiety.  The patient currently takes oxycodone, temazepam, vitamin C, ASA 81 mg, atorvastatin, carvedilol, enalapril, and escitalopram regularly.  The patient denies allergies to medications.  The patient has had a previous right-sided knee replacement as well as bariatric surgery in the past.\par \par On examination, the patient is alert, oriented, and compliant with the exam.  The patient demonstrates 5/5 strength in the lower extremities bilaterally.  The patient has point tenderness in the lumbar spine and uses a walker currently for ambulation.\par \par I have no new imaging to review today.  The patient's CT scan from January 4, 2022 demonstrates an L1 compression fracture.\par \par Taken together, the patient has a clinical history and radiographic findings most consistent with low back pain secondary to a compression fracture.  I explained to the patient that, especially because of her osteoporosis, she should consider wearing her brace fairly regularly to prevent progression of her compression fracture.  The patient already has a pain management doctor who is helping her with pain management and I have added Celebrex temporarily to her medication regimen.  I have recommended follow-up with us at approximately 6 weeks following her injury with updated x-rays to ensure stability of her fracture.  I have counseled the patient that she should contact our office sooner should she develop lower extremity symptoms or new symptoms she cannot explain.  The patient has also had an recent MRI scan of her lumbar spine which I have offered to review for her if she is able to provide our office with them. [FreeTextEntry3] : Alek Miller MD, PhD, FRCPSC \par Attending Neurosurgeon \par [facility] \par [address], [address]nd floor \[place] [address] \par Office: [phone] \par Fax: [phone]\par \par

## 2022-02-23 ENCOUNTER — RESULT REVIEW (OUTPATIENT)
Age: 67
End: 2022-02-23

## 2022-02-23 ENCOUNTER — APPOINTMENT (OUTPATIENT)
Dept: NEUROSURGERY | Facility: CLINIC | Age: 67
End: 2022-02-23
Payer: COMMERCIAL

## 2022-02-23 VITALS
HEIGHT: 64 IN | WEIGHT: 245 LBS | OXYGEN SATURATION: 96 % | BODY MASS INDEX: 41.83 KG/M2 | TEMPERATURE: 98 F | HEART RATE: 51 BPM

## 2022-02-23 DIAGNOSIS — R29.6 REPEATED FALLS: ICD-10-CM

## 2022-02-23 DIAGNOSIS — R26.2 DIFFICULTY IN WALKING, NOT ELSEWHERE CLASSIFIED: ICD-10-CM

## 2022-02-23 DIAGNOSIS — R51.9 HEADACHE, UNSPECIFIED: ICD-10-CM

## 2022-02-23 DIAGNOSIS — R55 SYNCOPE AND COLLAPSE: ICD-10-CM

## 2022-02-23 PROCEDURE — 99213 OFFICE O/P EST LOW 20 MIN: CPT

## 2022-02-23 PROCEDURE — 99214 OFFICE O/P EST MOD 30 MIN: CPT

## 2022-03-01 NOTE — CONSULT LETTER
[Dear  ___] : Dear  [unfilled], [Courtesy Letter:] : I had the pleasure of seeing your patient, [unfilled], in my office today. [Sincerely,] : Sincerely, [FreeTextEntry2] : Baldomero Ingram MD\par [address] \[place] [address]  [FreeTextEntry1] : Mrs. Johansen is a very pleasant 66-year-old female patient who was seen in our office today in follow-up.  The patient previously was being followed for a compression fracture suffered after a fall on January 4, 2022.\par \par Unfortunately, the patient is continuing to experience falls.  The patient states that her most recent fall was on February 6, 2022.  The patient recalls passing out prior to the fall.  The patient is being investigated by her cardiologist with regards to her possible syncopal episodes.  Unfortunately, the patient's pain has not changed significantly and is continuing to be rated as a 10/10 in severity.  The patient had numbness around her buttock region immediately after the fall but this has since resolved.  In addition to the patient's low back pain, the patient complains of numbness in the feet bilaterally, a right foot drop, and difficulty walking.  At her previous visit, the patient was complaining of her right leg "giving out".\par \par On examination, the patient remains alert, oriented, and compliant with the exam.  The patient demonstrates 5/5 strength in the lower extremities bilaterally.  The patient ambulates with a slightly wide-based gait but without any obvious foot drop.  The patient continues to use a walker for safety and stability.\par \par I have no new imaging to review today.  The patient did not obtain any updated imaging as requested.\par \par Taken together, the patient has a clinical history and radiographic findings most consistent with persistent pain secondary to a compression fracture at L1.  At this time, I have recommended MRI scans of the brain, thoracic, and cervical spine to rule out any other possible causes of ataxia and/or dizziness.  The patient has an MRI scan of the lumbar spine which was performed recently but I do not have access to the images or the reports at this time.  The patient neglected to bring in either at today's visit.  I have recommended a neurology follow-up for dizziness and syncopal episodes in addition to her cardiac work-up.  I have recommended additionally an updated x-ray given her most recent fall to rule out the possibility of yet another compression fracture.  The patient will be following up with us once her images are complete so that we can review the findings with her. [FreeTextEntry3] : Alek Miller MD, PhD, FRCPSC \par Attending Neurosurgeon \par [facility] \par [address], [address]nd floor \[place] [address] \par Office: [phone] \par Fax: [phone]\par \par

## 2022-03-01 NOTE — END OF VISIT
Lab Results   Component Value Date    HGBA1C 6 0 09/18/2019   Patient's last A1c was excellent at 6 0  With that information, and the fact that she is having significant issues with diarrhea, I recommend that she discontinue using the metformin  Recheck in the future as scheduled  I would not make any other adjustments at this time  [Time Spent: ___ minutes] : I have spent [unfilled] minutes of time on the encounter.

## 2022-03-10 ENCOUNTER — APPOINTMENT (OUTPATIENT)
Dept: RADIOLOGY | Facility: CLINIC | Age: 67
End: 2022-03-10
Payer: COMMERCIAL

## 2022-03-10 ENCOUNTER — OUTPATIENT (OUTPATIENT)
Dept: OUTPATIENT SERVICES | Facility: HOSPITAL | Age: 67
LOS: 1 days | End: 2022-03-10
Payer: MEDICARE

## 2022-03-10 DIAGNOSIS — Z95.1 PRESENCE OF AORTOCORONARY BYPASS GRAFT: Chronic | ICD-10-CM

## 2022-03-10 DIAGNOSIS — Z96.612 PRESENCE OF LEFT ARTIFICIAL SHOULDER JOINT: Chronic | ICD-10-CM

## 2022-03-10 DIAGNOSIS — Z98.890 OTHER SPECIFIED POSTPROCEDURAL STATES: Chronic | ICD-10-CM

## 2022-03-10 DIAGNOSIS — Z90.49 ACQUIRED ABSENCE OF OTHER SPECIFIED PARTS OF DIGESTIVE TRACT: Chronic | ICD-10-CM

## 2022-03-10 DIAGNOSIS — Z98.84 BARIATRIC SURGERY STATUS: Chronic | ICD-10-CM

## 2022-03-10 DIAGNOSIS — Z98.89 OTHER SPECIFIED POSTPROCEDURAL STATES: Chronic | ICD-10-CM

## 2022-03-10 DIAGNOSIS — S32.010A WEDGE COMPRESSION FRACTURE OF FIRST LUMBAR VERTEBRA, INITIAL ENCOUNTER FOR CLOSED FRACTURE: ICD-10-CM

## 2022-03-10 PROCEDURE — 72100 X-RAY EXAM L-S SPINE 2/3 VWS: CPT

## 2022-03-10 PROCEDURE — 72100 X-RAY EXAM L-S SPINE 2/3 VWS: CPT | Mod: 26

## 2022-04-07 ENCOUNTER — NON-APPOINTMENT (OUTPATIENT)
Age: 67
End: 2022-04-07

## 2022-04-12 ENCOUNTER — RESULT REVIEW (OUTPATIENT)
Age: 67
End: 2022-04-12

## 2022-04-12 ENCOUNTER — OUTPATIENT (OUTPATIENT)
Dept: OUTPATIENT SERVICES | Facility: HOSPITAL | Age: 67
LOS: 1 days | End: 2022-04-12
Payer: COMMERCIAL

## 2022-04-12 ENCOUNTER — APPOINTMENT (OUTPATIENT)
Dept: NEUROSURGERY | Facility: CLINIC | Age: 67
End: 2022-04-12
Payer: COMMERCIAL

## 2022-04-12 ENCOUNTER — APPOINTMENT (OUTPATIENT)
Dept: RADIOLOGY | Facility: CLINIC | Age: 67
End: 2022-04-12
Payer: COMMERCIAL

## 2022-04-12 VITALS
DIASTOLIC BLOOD PRESSURE: 110 MMHG | WEIGHT: 241 LBS | BODY MASS INDEX: 41.15 KG/M2 | OXYGEN SATURATION: 96 % | SYSTOLIC BLOOD PRESSURE: 165 MMHG | HEIGHT: 64 IN | HEART RATE: 50 BPM

## 2022-04-12 DIAGNOSIS — Z90.49 ACQUIRED ABSENCE OF OTHER SPECIFIED PARTS OF DIGESTIVE TRACT: Chronic | ICD-10-CM

## 2022-04-12 DIAGNOSIS — Z96.612 PRESENCE OF LEFT ARTIFICIAL SHOULDER JOINT: Chronic | ICD-10-CM

## 2022-04-12 DIAGNOSIS — Z95.1 PRESENCE OF AORTOCORONARY BYPASS GRAFT: Chronic | ICD-10-CM

## 2022-04-12 DIAGNOSIS — S32.010A WEDGE COMPRESSION FRACTURE OF FIRST LUMBAR VERTEBRA, INITIAL ENCOUNTER FOR CLOSED FRACTURE: ICD-10-CM

## 2022-04-12 DIAGNOSIS — Z98.84 BARIATRIC SURGERY STATUS: Chronic | ICD-10-CM

## 2022-04-12 DIAGNOSIS — Z98.89 OTHER SPECIFIED POSTPROCEDURAL STATES: Chronic | ICD-10-CM

## 2022-04-12 DIAGNOSIS — Z98.890 OTHER SPECIFIED POSTPROCEDURAL STATES: Chronic | ICD-10-CM

## 2022-04-12 PROCEDURE — 99214 OFFICE O/P EST MOD 30 MIN: CPT

## 2022-04-12 PROCEDURE — 72100 X-RAY EXAM L-S SPINE 2/3 VWS: CPT

## 2022-04-12 PROCEDURE — 72100 X-RAY EXAM L-S SPINE 2/3 VWS: CPT | Mod: 26

## 2022-04-12 NOTE — CONSULT LETTER
[Dear  ___] : Dear  [unfilled], [Courtesy Letter:] : I had the pleasure of seeing your patient, [unfilled], in my office today. [Sincerely,] : Sincerely, [FreeTextEntry2] : Baldomero Ingram MD\par [address]  [address] \[place] [address]  [FreeTextEntry1] : Ms. Johansen is a very pleasant 66-year-old female patient who was seen in our office today in follow-up. The patient is known to have an L1 compression fracture secondary to a fall suffered in January 2022.\par \par Unfortunately, the patient has suffered several subsequent falls since her original injury. The patient obtained an x-ray on March 10, 2022 which demonstrated worsening of her L1 compression fracture. Since these x-rays, the patient has had another 2 falls. The patient was recently taken to the emergency department on April 1, 2022 for lightheadedness and difficulty walking. The patient felt that her legs were very heavy and the patient's son said that she was very pale. Ultimately, the patient was taken to a nearby hospital where her systolic blood pressure was in the 70s. The patient is currently being worked up by her cardiologist with regards to the syncopal episodes. At this time, from a back pain perspective, the patient complains of an 8/10 severity back pain. The patient has not been wearing her LSO brace as instructed and we reminded the patient that she should continue to wear the brace. The patient's most recent fall was approximately 3 weeks ago.\par \par On examination, the patient is alert, oriented, and compliant with the exam. The patient ambulates with an antalgic gait and stooped posture. The patient demonstrates 5/5 strength in her lower extremities appropriate for her age\par \par  I do not have any new images to review. The patient did not obtain any images on April 1, 2022 when she presented to the emergency department despite her many falls. The patient did have an MRI scan of the brain performed on April 4, 2022 which demonstrates microvascular ischemic changes possibly related to aging.\par \par Taken together, the patient's clinical history and radiographic findings most consistent with chronic low back pain secondary to an L1 compression fracture. Given her multitude of falls after her most recent x-ray, I have recommended an updated x-ray at this time. I have recommended follow-up with a neurologist with regards to her persistent memory issues and given her strong history of dementia in the family. The patient has a pain management doctor which she will be following with regards to symptomatic relief. The patient has a cardiologist who is investigating her repeated syncopal and presyncopal episodes. I have recommended follow-up with us in approximately 3 weeks which would correspond to 6 weeks following her most recent fall. I have recommended continuing to wear her LSO brace in the meantime. [FreeTextEntry3] : Alek Miller MD, PhD, FRCSC, FAANS\par Attending Neurosurgeon\par  of Neurosurgery\par [facility]\par [facility] [place]\par [address],\[place] [address]\par Office: [phone]\par Fax: [phone]

## 2022-04-12 NOTE — CONSULT LETTER
[Dear  ___] : Dear  [unfilled], [Courtesy Letter:] : I had the pleasure of seeing your patient, [unfilled], in my office today. [Sincerely,] : Sincerely, [FreeTextEntry2] : Baldomero Ingram MD\par [address] \[place] [address]  [FreeTextEntry1] : Ms. Johansen is a very pleasant 66-year-old female patient who was seen in our office today in follow-up. The patient is known to have an L1 compression fracture secondary to a fall suffered in January 2022.\par \par Unfortunately, the patient has suffered several subsequent falls since her original injury. The patient obtained an x-ray on March 10, 2022 which demonstrated worsening of her L1 compression fracture. Since these x-rays, the patient has had another 2 falls. The patient was recently taken to the emergency department on April 1, 2022 for lightheadedness and difficulty walking. The patient felt that her legs were very heavy and the patient's son said that she was very pale. Ultimately, the patient was taken to a nearby hospital where her systolic blood pressure was in the 70s. The patient is currently being worked up by her cardiologist with regards to the syncopal episodes. At this time, from a back pain perspective, the patient complains of an 8/10 severity back pain. The patient has not been wearing her LSO brace as instructed and we reminded the patient that she should continue to wear the brace. The patient's most recent fall was approximately 3 weeks ago.\par \par On examination, the patient is alert, oriented, and compliant with the exam. The patient ambulates with an antalgic gait and stooped posture. The patient demonstrates 5/5 strength in her lower extremities appropriate for her age\par \par  I do not have any new images to review. The patient did not obtain any images on April 1, 2022 when she presented to the emergency department despite her many falls. The patient did have an MRI scan of the brain performed on April 4, 2022 which demonstrates microvascular ischemic changes possibly related to aging.\par \par Taken together, the patient's clinical history and radiographic findings most consistent with chronic low back pain secondary to an L1 compression fracture. Given her multitude of falls after her most recent x-ray, I have recommended an updated x-ray at this time. I have recommended follow-up with a neurologist with regards to her persistent memory issues and given her strong history of dementia in the family. The patient has a pain management doctor which she will be following with regards to symptomatic relief. The patient has a cardiologist who is investigating her repeated syncopal and presyncopal episodes. I have recommended follow-up with us in approximately 3 weeks which would correspond to 6 weeks following her most recent fall. I have recommended continuing to wear her LSO brace in the meantime. [FreeTextEntry3] : Alek Miller MD, PhD, FRCSC, FAANS\par Attending Neurosurgeon\par  of Neurosurgery\par [facility] at [facility]\par [facility] [place]\par [address],\[place] [address]\par Office: [phone]\par Fax: [phone]

## 2022-04-15 NOTE — PROVIDER CONTACT NOTE (CRITICAL VALUE NOTIFICATION) - NS PROVIDER READ BACK
[facility]  Internal Medicine Teaching Residency Program  Inpatient Daily Progress Note  ______________________________________________________________________________    Patient: Kashmir Chapa  YOB: 1964   RWT:[id number]    Acct: [de-identified]     Room: [address] [place] [address]  Admit date: 4/3/2022  Today's date: 04/15/22  Number of days in the hospital: 12    SUBJECTIVE   Admitting Diagnosis: NSTEMI (non-ST elevated myocardial infarction) ([facility] [address]  CC: Typical chest pain  POD #5, S/P CABG  Patient examined at bedside, extubated 4/12 successfully   No acute events overnight. Patient is afebrile and hemodynamically stable. Weaned from BiPAP to nasal cannula currently currently on 3 L. Saturating 96% on 3 L nasal cannula. Chest tubes taken out 4/13  Patient is able to tolerate oral medications. Swallow study shows no aspiration. Currently on cefepime for pneumonia, pulmonary on board   Labs show leukocytosis wbc 17.5-->16.9    ROS: Patient drowsy, unable to assess    BRIEF HISTORY     The patient is a pleasant 62 y.o. female with a past medical history significant for essential hypertension, coronary artery disease status post PCI on DAPT, hyperlipidemia, chronic active smoking, history of stroke, MDD, memory problem presents with a chief complaint of typical chest pain. Chest pain started 3 to 4 days ago, dull aching in nature, 5-6 out of 10 in intensity, radiating to the left shoulder, increased with exertion and relieved with rest.  Also admits to exertional shortness of breath without orthopnea or PND. Patient denies palpitation syncope nausea vomiting diarrhea headache.     Evaluation in the ER, she was found to have elevated blood pressure 175/110. Heart rate 78 regular. SPO2 99% on room air. Heart auscultation showed normal first and second heart sound without murmur gallop. Chest is clear to auscultation. Abdomen soft and nontender.   Labs reviewed normal BMP. No leukocytosis hemoglobin 14. EKG showed normal sinus rhythm with some ST-T wave changes in inferior lead. Troponin is elevated 80. proBNP 217. Chest x-ray is negative for acute abnormality. Cardiology has been consulted in the ER for the concern of NSTEMI. Patient is started on heparin drip as per ACS protocol and will probably have cardiac cath tomorrow.     Of note, she has a past medical history of coronary artery disease status post PCI in ? Followed by another PCI in ? Questionable medication compliance. She has been actively smoking and is currently half pack per day. Denies alcohol intake. Admits to marijuana use.     Patient underwent cardiac cath on 2022. Findings:  LMCA: Distal 30-40% stenosis   LAD: Mid stent stenosis 80%   LCx: Mid area 90% after the OM take off   OM1: in stent stenosis 90%   RCA: Proximal 75% stenosis   Mid area ulcerated plaques with 60% stenosis   PL branch ostial / proximal 90% stenosis     The LV gram was performed in the LAWTON 30 position. LVEF: 50 %. Conclusions:   Multivessel CAD with left main disease    PLower normal LV systolic function   Recommendations:  CT surgery consult and recommend CABG. 4/10/-s/p CABG, chest tubes in place, on BiPAP  2022. All chest tube in place. Reintubated for worsening oxygen requirement. Currently on FiO2 40% with a PEEP of 8.  2022. On mechanical ventilator with FiO2 40% and PEEP of 8.  22-extubated yesterday, currently saturating well on BiPAP, plan to wean to nasal cannula and get swallow study thereafter, chest tubes taken out  4/15/122 - CXR shows improved atlelectasis and left pleural effusion.     OBJECTIVE     Vital Signs:  /77   Pulse 92   Temp 98.5 °F (36.9 °C) (Oral)   Resp 16   Ht 5' 1\" (1.549 m)   Wt 160 lb 7.9 oz (72.8 kg)   SpO2 96%   BMI 30.33 kg/m²     Temp (24hrs), Av °F (36.7 °C), Min:97.7 °F (36.5 °C), Max:98.5 °F (36.9 °C)    In: 340   Out: 2300 [[id number]]    Physical Exam:  Constitutional: Drowsy, on BiPAP   EENT:  PERRLA. No conjunctival injections. Neck: Supple without thyromegaly. No elevated JVP. Trachea was midline. Respiratory: Breath sounds improved bilaterally. No crackles, rhonchi or rails. Cardiovascular: Chest tubes taken out. Normal heart sounds with no murmurs, rubs or gallops. Abdomen: Slightly rounded and soft without organomegaly. No rebound, rigidity or guarding was appreciated. Lymphatic: No lymphadenopathy. Musculoskeletal: Normal curvature of the spine. No gross muscle weakness. Extremities: Compression stockings on bilateral lower extremity no lower extremity edema, ulcerations, tenderness, varicosities or erythema. Muscle size, tone and strength are normal.  No involuntary movements are noted. Skin:  Warm and dry. Good color, turgor and pigmentation. No lesions or scars. No cyanosis or clubbing  Neurological/Psychiatric: Patient is more awake, alert and oriented today.     Medications:  Scheduled Medications:    metoprolol  2.5 mg IntraVENous Q6H    cefepime  2,000 mg IntraVENous Q8H    furosemide  20 mg IntraVENous BID    docusate  100 mg Oral BID    sodium chloride flush  5-40 mL IntraVENous 2 times per day    sodium chloride flush  5-40 mL IntraVENous 2 times per day    sodium chloride flush  10 mL IntraVENous 2 times per day    aspirin  81 mg Oral Daily    clopidogrel  75 mg Oral Daily    amiodarone  200 mg Oral TID    [Held by provider] metoprolol tartrate  25 mg Oral BID    atorvastatin  20 mg Oral Nightly    pantoprazole  40 mg Oral Daily    pantoprazole (PROTONIX) 40 mg injection  40 mg IntraVENous Daily    ipratropium-albuterol  1 ampule Inhalation Q4H WA    senna  1 tablet Oral Once    [Held by provider] hydroCHLOROthiazide  25 mg Oral Daily    lovastatin  10 mg Oral Nightly    sodium chloride flush  5-40 mL IntraVENous 2 times per day    [Held by provider] amLODIPine  10 mg Oral yes Daily    nicotine  1 patch TransDERmal Daily     Continuous Infusions:    sodium chloride Stopped (04/11/22 1935)    sodium chloride Stopped (04/11/22 0447)    dextrose 25 mL/hr (04/12/22 1831)    EPINEPHrine      sodium chloride 20 mL/hr at 04/04/22 0408     PRN Medicationslabetalol, 10 mg, Q4H PRN  bisacodyl, 10 mg, Daily PRN  ketorolac, 15 mg, Q6H PRN  LORazepam, 0.5 mg, Q6H PRN  morphine, 2 mg, Q4H PRN  albuterol, 2.5 mg, Q6H PRN  acetylcysteine, 600 mg, Q4H PRN  sodium chloride flush, 5-40 mL, PRN  sodium chloride, 25 mL, PRN  sodium chloride flush, 10 mL, PRN  ondansetron, 4 mg, Q8H PRN   Or  ondansetron, 4 mg, Q6H PRN  oxyCODONE-acetaminophen, 1 tablet, Q4H PRN   Or  oxyCODONE-acetaminophen, 2 tablet, Q4H PRN  fentanNYL, 25 mcg, Q1H PRN   Or  fentanNYL, 50 mcg, Q1H PRN  hydrALAZINE, 5 mg, Q5 Min PRN  diphenhydrAMINE, 25 mg, Nightly PRN  potassium chloride, 20 mEq, PRN  magnesium sulfate, 2,000 mg, PRN  albumin human, 25 g, PRN  glucose, 15 g, PRN  dextrose, 12.5 g, PRN  glucagon (rDNA), 1 mg, PRN  dextrose, 100 mL/hr, PRN  EPINEPHrine, 0.1-0.3 mcg/kg/min, Continuous PRN  magnesium hydroxide, 30 mL, Daily PRN  aspirin-acetaminophen-caffeine, 1 tablet, Q8H PRN  magnesium sulfate, 1,000 mg, PRN  potassium chloride, 10 mEq, PRN  potassium chloride, 40 mEq, PRN   Or  potassium alternative oral replacement, 40 mEq, PRN   Or  potassium chloride, 10 mEq, PRN  sodium chloride, , PRN  polyethylene glycol, 17 g, Daily PRN  acetaminophen, 650 mg, Q6H PRN   Or  acetaminophen, 650 mg, Q6H PRN  calcium carbonate, 500 mg, TID PRN        Diagnostic Labs:  CBC:   Recent Labs     04/13/22  0316 04/13/22 1832 04/14/22  0308 04/14/22  1052 04/15/22  0449   WBC 18.3*  --  17.5*  --  16.9*   RBC 3.14*  --  3.24*  --  3.12*   HGB 9.4*   < > 9.5* 10.0* 9.2*   HCT 28.3*   < > 29.8* 30.1* 28.5*   MCV 90.1  --  92.0  --  91.3   RDW 14.9*  --  14.4  --  13.9     --  238  --  279    < > = values in this interval not displayed. BMP:   Recent Labs     04/13/22  0316 04/14/22  0308 04/15/22  0449    135 132*   K 3.9 3.7 3.7    100 97*   CO2 25 24 24   BUN 14 24* 19   CREATININE 0.61 0.86 0.69     BNP: No results for input(s): BNP in the last 72 hours. PT/INR:   Recent Labs     04/13/22  0316 04/14/22  0308 04/15/22  0449   PROTIME 10.8 11.3 11.9   INR 1.0 1.1 1.1     APTT:   No results for input(s): APTT in the last 72 hours. CARDIAC ENZYMES: No results for input(s): CKMB, CKMBINDEX, TROPONINI in the last 72 hours. Invalid input(s): CKTOTAL;3  FASTING LIPID PANEL:  Lab Results   Component Value Date    CHOL 243 (H) 03/24/2021    HDL 36 (L) 12/01/2021    TRIG 178 (H) 03/24/2021     LIVER PROFILE:   No results for input(s): AST, ALT, ALB, BILIDIR, BILITOT, ALKPHOS in the last 72 hours. MICROBIOLOGY:   Lab Results   Component Value Date/Time    CULTURE PSEUDOMONAS AERUGINOSA <20027 CFU/ML (A) 04/10/2022 12:43 PM    CULTURE NO NORMAL JUSTINA 04/10/2022 12:43 PM       Imaging:    XR CHEST PORTABLE    Result Date: 4/3/2022  No acute process. ASSESSMENT & PLAN     IMPRESSION  This is a 62 y.o. female with a past medical history of hypertension hyperlipidemia coronary artery disease status post PCI, history of stroke and memory problem who presented with typical chest pain and exertional shortness of breath and found to have ST-T wave changes in the inferior leads and elevated troponin 80. Patient admitted to inpatient status for the management of NSTEMI. She underwent cardiac cath and found to have multivessel coronary artery disease. CT surgery taken on board and s/p  CABG.        Principal Problem:    NSTEMI (non-ST elevated myocardial infarction) ([facility] [address]  Active Problems:    CAD (coronary artery disease) with multiple stents    Asthma    Smoker    Anxiety    Essential hypertension    Memory problem    Mixed hyperlipidemia    Thyroid nodule    Multiple vessel coronary artery disease s/p CABG   Resolved Problems:    * No resolved hospital problems. *    NSTEMI with multivessel coronary artery disease s/p CABG-  -chest tubes taken out by cardiothoracic surgery yesterday.  -On ASA Plavix ,statin, amiodarone    Acute hypoxic failure, concern for aspiration pneumonia and bilateral mucous plugging  History of COPD  -Extubated yesterday, currently on BiPAP-->nasal cannula  - s/p  bronchoscopy on 4/10-mucous plugging  -Bronchoalveolar lavage respiratory culture grew Pseudomonas. Started on cefepime and IV vancomycin, currently on cefepime\  -Also on Lasix 20 twice daily  -Pulmonary/critical care following. Postoperative blood loss anemia s/p multiple transfusion  Received 5 units of transfusion since surgery. Hemoglobin currently stable    Essential hypertension:  Currently on IV medications until patient passes swallow study and can eat. Smoker  Continue  on nicotine patch.     Mixed hyperlipidemia  On atorvastatin 20 mg.      Thyroid nodule  CT chest showed 3 cm incidental right thyroid nodule. Subsequent thyroid ultrasound showed 2.9 cm slight heterogeneous cystic. No follow-up study recommended. TSH 1.33.     DVT ppx: EPC cuff only  GI ppx: Protonix    Discharge planning-Per primary team    Jace Joyce MD  PGY-1, Internal Medicine Resident  [address] [facility]         4/15/2022, 7:59 AM          Attending Physician Statement  I have discussed the care of [address] Jan[address] Vidal Final and I have examined the patient myselft and taken ros and hpi , including pertinent history and exam findings,  with the resident. I have reviewed the key elements of all parts of the encounter with the resident. I agree with the assessment, plan and orders as documented by the resident.   Multivessel CAD, s/p CABG  Hx of COPD/Emphysema,   AC hypoxic resp failure, reintubation   Aspiration PNA ,   Anemia s/ pRBC   Was On mechanical vent,   vent settings reviewed   Subsequently extubated , was anxious and tachycardic  Better now   Improving   IS   PT/OT   Chest tubes have been taken out '  CXR reviewed  Improved ,     Electronically signed by Prosper Mckinley MD

## 2022-04-22 ENCOUNTER — NON-APPOINTMENT (OUTPATIENT)
Age: 67
End: 2022-04-22

## 2022-05-03 ENCOUNTER — APPOINTMENT (OUTPATIENT)
Dept: RADIOLOGY | Facility: CLINIC | Age: 67
End: 2022-05-03
Payer: COMMERCIAL

## 2022-05-03 ENCOUNTER — APPOINTMENT (OUTPATIENT)
Dept: NEUROSURGERY | Facility: CLINIC | Age: 67
End: 2022-05-03
Payer: COMMERCIAL

## 2022-05-03 ENCOUNTER — OUTPATIENT (OUTPATIENT)
Dept: OUTPATIENT SERVICES | Facility: HOSPITAL | Age: 67
LOS: 1 days | End: 2022-05-03
Payer: COMMERCIAL

## 2022-05-03 VITALS
HEART RATE: 74 BPM | OXYGEN SATURATION: 96 % | BODY MASS INDEX: 41.83 KG/M2 | TEMPERATURE: 98.7 F | HEIGHT: 64 IN | DIASTOLIC BLOOD PRESSURE: 87 MMHG | SYSTOLIC BLOOD PRESSURE: 161 MMHG | WEIGHT: 245 LBS

## 2022-05-03 DIAGNOSIS — Z98.890 OTHER SPECIFIED POSTPROCEDURAL STATES: Chronic | ICD-10-CM

## 2022-05-03 DIAGNOSIS — Z90.49 ACQUIRED ABSENCE OF OTHER SPECIFIED PARTS OF DIGESTIVE TRACT: Chronic | ICD-10-CM

## 2022-05-03 DIAGNOSIS — Z98.89 OTHER SPECIFIED POSTPROCEDURAL STATES: Chronic | ICD-10-CM

## 2022-05-03 DIAGNOSIS — Z98.84 BARIATRIC SURGERY STATUS: Chronic | ICD-10-CM

## 2022-05-03 DIAGNOSIS — S32.010A WEDGE COMPRESSION FRACTURE OF FIRST LUMBAR VERTEBRA, INITIAL ENCOUNTER FOR CLOSED FRACTURE: ICD-10-CM

## 2022-05-03 DIAGNOSIS — Z96.612 PRESENCE OF LEFT ARTIFICIAL SHOULDER JOINT: Chronic | ICD-10-CM

## 2022-05-03 DIAGNOSIS — Z95.1 PRESENCE OF AORTOCORONARY BYPASS GRAFT: Chronic | ICD-10-CM

## 2022-05-03 PROCEDURE — 72084 X-RAY EXAM ENTIRE SPI 6/> VW: CPT | Mod: 26

## 2022-05-03 PROCEDURE — 99215 OFFICE O/P EST HI 40 MIN: CPT

## 2022-05-03 PROCEDURE — 72082 X-RAY EXAM ENTIRE SPI 2/3 VW: CPT

## 2022-05-03 PROCEDURE — 72110 X-RAY EXAM L-2 SPINE 4/>VWS: CPT

## 2022-05-03 RX ORDER — CELECOXIB 200 MG/1
200 CAPSULE ORAL TWICE DAILY
Qty: 60 | Refills: 0 | Status: ACTIVE | COMMUNITY
Start: 2022-01-19 | End: 1900-01-01

## 2022-05-03 NOTE — CONSULT LETTER
[Dear  ___] : Dear  [unfilled], [Courtesy Letter:] : I had the pleasure of seeing your patient, [unfilled], in my office today. [Sincerely,] : Sincerely, [FreeTextEntry2] : Baldomero Ingram  [place] [address]  [address]  [place] [address]  [FreeTextEntry1] : Mrs. Johansen is a very pleasant 66-year-old female patient who was seen in our office today in follow-up regarding a L1 compression fracture.\par \par Overall, the patient has not noticed any improvement in her pain since the last time we met approximately 1 month ago.  The patient continues to complain of 10/10 pain in the low back which radiates into the hips bilaterally.  The patient is currently not wearing her TLSO brace as instructed.  The pain does not radiate down the into the lower extremities and stays around the hip region.  The patient is accompanied with several images which we reviewed today.\par \par On examination, the patient remains alert, oriented, and compliant with the exam.  The patient demonstrates full strength in the lower extremities bilaterally.  The patient has difficulty with transitions from a seated to standing position secondary to pain.  The patient has point tenderness in the upper lumbar spine.\par \par The patient has a clinical the patient has x-rays of the lumbar spine dated March 10, 2022 and April 12, 2022.  These images demonstrate a kyphosis across the L1 compression fracture measuring from the superior endplate of T12 to the inferior endplate of L2 measuring 32 to 33 degrees. There is significant progression of the fracture since her original scan from January 4, 2022. Unfortunately, the T12 vertebral body was not completely imaged on these scans. The patient is accompanied with x-rays performed May 3, 2022 which demonstrates a slightly leftward coronal balance to the left of approximately 1-1/2 cm.  Despite the patient's compression fracture, the patient does have a positive sagittal balance of approximately 1.7 cm which is within physiologic parameters.  However, some errors in measurement may exist because these images are stitched.  The patient is additionally accompanied with flexion/extension x-rays which demonstrate stability of the patient's compression fracture.\par \par Taken together, the patient has a clinical history and radiographic findings most consistent with acute on chronic low back pain.  The patient's most recent acute worsening is most likely related to her compression fracture and, given failure of conservative therapy, I have offered the patient the option of a kyphoplasty/vertebroplasty.  We did discuss the role of more invasive surgery including a lumbar fusion from T12 to  L2, but I have indicated that the patient has had a very high risk for hardware failure given her osteoporosis.  Moreover, the patient's sagittal balance does not appear overly positive.  At this time, the patient would like a refill on her Celebrex which I provided and would like to return home to discuss the possibility of this procedure with her family.  The patient will contact our office should she elect to proceed with surgical intervention.\par \par \par  [FreeTextEntry3] : Alek Miller MD, PhD, FRCSC, FAANS\par Attending Neurosurgeon\par  of Neurosurgery\par [facility]\par [facility] [place]\par [address],\[place] [address]\par Office: [phone]\par Fax: [phone]\par

## 2022-05-12 ENCOUNTER — NON-APPOINTMENT (OUTPATIENT)
Age: 67
End: 2022-05-12

## 2022-05-20 ENCOUNTER — APPOINTMENT (OUTPATIENT)
Dept: NEUROLOGY | Facility: CLINIC | Age: 67
End: 2022-05-20

## 2022-06-02 ENCOUNTER — OUTPATIENT (OUTPATIENT)
Dept: OUTPATIENT SERVICES | Facility: HOSPITAL | Age: 67
LOS: 1 days | End: 2022-06-02
Payer: COMMERCIAL

## 2022-06-02 VITALS
HEIGHT: 64 IN | RESPIRATION RATE: 16 BRPM | WEIGHT: 253.53 LBS | TEMPERATURE: 98 F | DIASTOLIC BLOOD PRESSURE: 64 MMHG | HEART RATE: 47 BPM | OXYGEN SATURATION: 95 % | SYSTOLIC BLOOD PRESSURE: 135 MMHG

## 2022-06-02 DIAGNOSIS — Z98.890 OTHER SPECIFIED POSTPROCEDURAL STATES: Chronic | ICD-10-CM

## 2022-06-02 DIAGNOSIS — Z90.49 ACQUIRED ABSENCE OF OTHER SPECIFIED PARTS OF DIGESTIVE TRACT: Chronic | ICD-10-CM

## 2022-06-02 DIAGNOSIS — Z01.818 ENCOUNTER FOR OTHER PREPROCEDURAL EXAMINATION: ICD-10-CM

## 2022-06-02 DIAGNOSIS — Z96.612 PRESENCE OF LEFT ARTIFICIAL SHOULDER JOINT: Chronic | ICD-10-CM

## 2022-06-02 DIAGNOSIS — Z95.1 PRESENCE OF AORTOCORONARY BYPASS GRAFT: Chronic | ICD-10-CM

## 2022-06-02 DIAGNOSIS — Z98.89 OTHER SPECIFIED POSTPROCEDURAL STATES: Chronic | ICD-10-CM

## 2022-06-02 DIAGNOSIS — Z96.651 PRESENCE OF RIGHT ARTIFICIAL KNEE JOINT: Chronic | ICD-10-CM

## 2022-06-02 DIAGNOSIS — Z98.84 BARIATRIC SURGERY STATUS: Chronic | ICD-10-CM

## 2022-06-02 DIAGNOSIS — S32.010A WEDGE COMPRESSION FRACTURE OF FIRST LUMBAR VERTEBRA, INITIAL ENCOUNTER FOR CLOSED FRACTURE: ICD-10-CM

## 2022-06-02 DIAGNOSIS — M48.56XA COLLAPSED VERTEBRA, NOT ELSEWHERE CLASSIFIED, LUMBAR REGION, INITIAL ENCOUNTER FOR FRACTURE: ICD-10-CM

## 2022-06-02 LAB
A1C WITH ESTIMATED AVERAGE GLUCOSE RESULT: 5.3 % — SIGNIFICANT CHANGE UP (ref 4–5.6)
ANION GAP SERPL CALC-SCNC: 3 MMOL/L — LOW (ref 5–17)
APPEARANCE UR: CLEAR — SIGNIFICANT CHANGE UP
APTT BLD: 29.7 SEC — SIGNIFICANT CHANGE UP (ref 27.5–35.5)
BASOPHILS # BLD AUTO: 0.05 K/UL — SIGNIFICANT CHANGE UP (ref 0–0.2)
BASOPHILS NFR BLD AUTO: 0.7 % — SIGNIFICANT CHANGE UP (ref 0–2)
BILIRUB UR-MCNC: NEGATIVE — SIGNIFICANT CHANGE UP
BUN SERPL-MCNC: 24 MG/DL — HIGH (ref 7–23)
CALCIUM SERPL-MCNC: 9.4 MG/DL — SIGNIFICANT CHANGE UP (ref 8.5–10.1)
CHLORIDE SERPL-SCNC: 108 MMOL/L — SIGNIFICANT CHANGE UP (ref 96–108)
CO2 SERPL-SCNC: 28 MMOL/L — SIGNIFICANT CHANGE UP (ref 22–31)
COLOR SPEC: YELLOW — SIGNIFICANT CHANGE UP
CREAT SERPL-MCNC: 1.35 MG/DL — HIGH (ref 0.5–1.3)
DIFF PNL FLD: NEGATIVE — SIGNIFICANT CHANGE UP
EGFR: 43 ML/MIN/1.73M2 — LOW
EOSINOPHIL # BLD AUTO: 0.34 K/UL — SIGNIFICANT CHANGE UP (ref 0–0.5)
EOSINOPHIL NFR BLD AUTO: 4.6 % — SIGNIFICANT CHANGE UP (ref 0–6)
ESTIMATED AVERAGE GLUCOSE: 105 MG/DL — SIGNIFICANT CHANGE UP (ref 68–114)
GLUCOSE SERPL-MCNC: 89 MG/DL — SIGNIFICANT CHANGE UP (ref 70–99)
GLUCOSE UR QL: NEGATIVE — SIGNIFICANT CHANGE UP
HCT VFR BLD CALC: 38.2 % — SIGNIFICANT CHANGE UP (ref 34.5–45)
HGB BLD-MCNC: 11.8 G/DL — SIGNIFICANT CHANGE UP (ref 11.5–15.5)
IMM GRANULOCYTES NFR BLD AUTO: 0.3 % — SIGNIFICANT CHANGE UP (ref 0–1.5)
INR BLD: 1.05 RATIO — SIGNIFICANT CHANGE UP (ref 0.88–1.16)
KETONES UR-MCNC: NEGATIVE — SIGNIFICANT CHANGE UP
LEUKOCYTE ESTERASE UR-ACNC: NEGATIVE — SIGNIFICANT CHANGE UP
LYMPHOCYTES # BLD AUTO: 1.94 K/UL — SIGNIFICANT CHANGE UP (ref 1–3.3)
LYMPHOCYTES # BLD AUTO: 26.4 % — SIGNIFICANT CHANGE UP (ref 13–44)
MCHC RBC-ENTMCNC: 30.9 GM/DL — LOW (ref 32–36)
MCHC RBC-ENTMCNC: 31.1 PG — SIGNIFICANT CHANGE UP (ref 27–34)
MCV RBC AUTO: 100.8 FL — HIGH (ref 80–100)
MONOCYTES # BLD AUTO: 0.62 K/UL — SIGNIFICANT CHANGE UP (ref 0–0.9)
MONOCYTES NFR BLD AUTO: 8.4 % — SIGNIFICANT CHANGE UP (ref 2–14)
MRSA PCR RESULT.: SIGNIFICANT CHANGE UP
NEUTROPHILS # BLD AUTO: 4.39 K/UL — SIGNIFICANT CHANGE UP (ref 1.8–7.4)
NEUTROPHILS NFR BLD AUTO: 59.6 % — SIGNIFICANT CHANGE UP (ref 43–77)
NITRITE UR-MCNC: NEGATIVE — SIGNIFICANT CHANGE UP
PH UR: 5 — SIGNIFICANT CHANGE UP (ref 5–8)
PLATELET # BLD AUTO: 223 K/UL — SIGNIFICANT CHANGE UP (ref 150–400)
POTASSIUM SERPL-MCNC: 5 MMOL/L — SIGNIFICANT CHANGE UP (ref 3.5–5.3)
POTASSIUM SERPL-SCNC: 5 MMOL/L — SIGNIFICANT CHANGE UP (ref 3.5–5.3)
PROT UR-MCNC: NEGATIVE — SIGNIFICANT CHANGE UP
PROTHROM AB SERPL-ACNC: 12.2 SEC — SIGNIFICANT CHANGE UP (ref 10.5–13.4)
RBC # BLD: 3.79 M/UL — LOW (ref 3.8–5.2)
RBC # FLD: 12.7 % — SIGNIFICANT CHANGE UP (ref 10.3–14.5)
S AUREUS DNA NOSE QL NAA+PROBE: SIGNIFICANT CHANGE UP
SODIUM SERPL-SCNC: 139 MMOL/L — SIGNIFICANT CHANGE UP (ref 135–145)
SP GR SPEC: 1.01 — SIGNIFICANT CHANGE UP (ref 1.01–1.02)
UROBILINOGEN FLD QL: NEGATIVE — SIGNIFICANT CHANGE UP
WBC # BLD: 7.36 K/UL — SIGNIFICANT CHANGE UP (ref 3.8–10.5)
WBC # FLD AUTO: 7.36 K/UL — SIGNIFICANT CHANGE UP (ref 3.8–10.5)

## 2022-06-02 PROCEDURE — 86901 BLOOD TYPING SEROLOGIC RH(D): CPT

## 2022-06-02 PROCEDURE — 86900 BLOOD TYPING SEROLOGIC ABO: CPT

## 2022-06-02 PROCEDURE — G0463: CPT | Mod: 25

## 2022-06-02 PROCEDURE — 86850 RBC ANTIBODY SCREEN: CPT

## 2022-06-02 PROCEDURE — 81003 URINALYSIS AUTO W/O SCOPE: CPT

## 2022-06-02 PROCEDURE — 85730 THROMBOPLASTIN TIME PARTIAL: CPT

## 2022-06-02 PROCEDURE — 85025 COMPLETE CBC W/AUTO DIFF WBC: CPT

## 2022-06-02 PROCEDURE — 87640 STAPH A DNA AMP PROBE: CPT

## 2022-06-02 PROCEDURE — 82040 ASSAY OF SERUM ALBUMIN: CPT

## 2022-06-02 PROCEDURE — 87641 MR-STAPH DNA AMP PROBE: CPT

## 2022-06-02 PROCEDURE — 80048 BASIC METABOLIC PNL TOTAL CA: CPT

## 2022-06-02 PROCEDURE — 93010 ELECTROCARDIOGRAM REPORT: CPT

## 2022-06-02 PROCEDURE — 36415 COLL VENOUS BLD VENIPUNCTURE: CPT

## 2022-06-02 PROCEDURE — 85610 PROTHROMBIN TIME: CPT

## 2022-06-02 PROCEDURE — 83036 HEMOGLOBIN GLYCOSYLATED A1C: CPT

## 2022-06-02 PROCEDURE — 93005 ELECTROCARDIOGRAM TRACING: CPT

## 2022-06-02 RX ORDER — ZINC SULFATE TAB 220 MG (50 MG ZINC EQUIVALENT) 220 (50 ZN) MG
1 TAB ORAL
Qty: 0 | Refills: 0 | DISCHARGE

## 2022-06-02 NOTE — H&P PST ADULT - NSICDXPASTSURGICALHX_GEN_ALL_CORE_FT
PAST SURGICAL HISTORY:  H/O total knee replacement, right 10/2018    History of esophagogastroduodenoscopy (EGD)     History of loop recorder 3/2022    S/P  1980, 1982    S/P CABG x 2 3/2009    S/P cholecystectomy     S/P colonoscopy     S/P laparoscopic sleeve gastrectomy     S/P shoulder replacement, left 2012    Status post lung surgery age 6 --left upper lobectomy, benign issue

## 2022-06-02 NOTE — H&P PST ADULT - RS GEN PE MLT RESP DETAILS PC
breath sounds equal/respirations non-labored/clear to auscultation bilaterally/no rhonchi/no wheezes

## 2022-06-02 NOTE — H&P PST ADULT - NSICDXPASTMEDICALHX_GEN_ALL_CORE_FT
PAST MEDICAL HISTORY:  Asthma ????--pneumonia yearly    Back pain     Bradycardia     CAD (coronary artery disease)     COVID-19 virus infection 12/2021    Frequent PVCs     History of vertebral fracture L1    Hyperlipidemia     Hypertension     Insomnia     Knee pain, right     Morbid obesity     Myocardial infarction 2009    Osteoarthritis     Pain management Dr Pagan    Right bundle branch block     Sleep apnea resolved with gastric sleeve surgery    Spinal stenosis     Syncopal episodes multiple falls--last fall 4/2022, mild concussion, residual headaches

## 2022-06-02 NOTE — H&P PST ADULT - BACK COMMENTS
lumbar site clear, no paraspinal tenderness, pain on ROM, limited ROM lumbar site clear, +paraspinal tenderness, pain on ROM, limited ROM

## 2022-06-02 NOTE — H&P PST ADULT - HISTORY OF PRESENT ILLNESS
66 y.o  66 y.o WD, WN morbidly obese female presents to PST with hx of syncopal episodes, multiple falls since January 2021. She reports severe back pain for which she was evaluated and noted to have a lumbar compression fracture. She has followed with neurosurgeon and now scheduled for a kyphoplasty. Patient's hx significant for HTN, CAD, OA and Hyperlipidemia. Scheduled for L1 Kyphoplasty

## 2022-06-02 NOTE — H&P PST ADULT - ASSESSMENT
66 y.o female scheduled for  66 y.o female scheduled for  L1 Kyphoplasty   Plan  1. Stop all NSAIDS, herbal supplements and vitamins for 7 days.  2. NPO at midnight.  3. Take the following medications Carvedilol Enalapril, Lexapro  with small sips of water on the morning of your procedure/surgery.  4. Use EZ sponges as directed  5. Use mupirocin as directed  6. Labs, EKG, CXR as per surgeon  7. PMD JEANNINE Ingram & LOKI Miller cardiologist visit for optimization prior to surgery as per surgeon.  8. COVID swab appt: 6/7/2022

## 2022-06-03 DIAGNOSIS — S32.010A WEDGE COMPRESSION FRACTURE OF FIRST LUMBAR VERTEBRA, INITIAL ENCOUNTER FOR CLOSED FRACTURE: ICD-10-CM

## 2022-06-03 DIAGNOSIS — Z01.818 ENCOUNTER FOR OTHER PREPROCEDURAL EXAMINATION: ICD-10-CM

## 2022-06-08 LAB — SARS-COV-2 N GENE NPH QL NAA+PROBE: NOT DETECTED

## 2022-06-09 RX ORDER — ONDANSETRON 8 MG/1
4 TABLET, FILM COATED ORAL ONCE
Refills: 0 | Status: DISCONTINUED | OUTPATIENT
Start: 2022-06-10 | End: 2022-06-10

## 2022-06-09 RX ORDER — SODIUM CHLORIDE 9 MG/ML
1000 INJECTION, SOLUTION INTRAVENOUS
Refills: 0 | Status: DISCONTINUED | OUTPATIENT
Start: 2022-06-10 | End: 2022-06-10

## 2022-06-09 RX ORDER — FENTANYL CITRATE 50 UG/ML
50 INJECTION INTRAVENOUS
Refills: 0 | Status: DISCONTINUED | OUTPATIENT
Start: 2022-06-10 | End: 2022-06-10

## 2022-06-10 ENCOUNTER — APPOINTMENT (OUTPATIENT)
Dept: NEUROSURGERY | Facility: HOSPITAL | Age: 67
End: 2022-06-10

## 2022-06-10 ENCOUNTER — OUTPATIENT (OUTPATIENT)
Dept: INPATIENT UNIT | Facility: HOSPITAL | Age: 67
LOS: 1 days | Discharge: HOME | End: 2022-06-10
Payer: COMMERCIAL

## 2022-06-10 ENCOUNTER — TRANSCRIPTION ENCOUNTER (OUTPATIENT)
Age: 67
End: 2022-06-10

## 2022-06-10 VITALS
WEIGHT: 244.93 LBS | HEIGHT: 64 IN | HEART RATE: 45 BPM | DIASTOLIC BLOOD PRESSURE: 58 MMHG | RESPIRATION RATE: 16 BRPM | TEMPERATURE: 97 F | OXYGEN SATURATION: 100 % | SYSTOLIC BLOOD PRESSURE: 148 MMHG

## 2022-06-10 VITALS
DIASTOLIC BLOOD PRESSURE: 84 MMHG | OXYGEN SATURATION: 96 % | TEMPERATURE: 97 F | SYSTOLIC BLOOD PRESSURE: 155 MMHG | RESPIRATION RATE: 14 BRPM | HEART RATE: 58 BPM

## 2022-06-10 DIAGNOSIS — Z98.890 OTHER SPECIFIED POSTPROCEDURAL STATES: Chronic | ICD-10-CM

## 2022-06-10 DIAGNOSIS — I10 ESSENTIAL (PRIMARY) HYPERTENSION: ICD-10-CM

## 2022-06-10 DIAGNOSIS — I49.3 VENTRICULAR PREMATURE DEPOLARIZATION: ICD-10-CM

## 2022-06-10 DIAGNOSIS — Z79.82 LONG TERM (CURRENT) USE OF ASPIRIN: ICD-10-CM

## 2022-06-10 DIAGNOSIS — M48.56XA COLLAPSED VERTEBRA, NOT ELSEWHERE CLASSIFIED, LUMBAR REGION, INITIAL ENCOUNTER FOR FRACTURE: ICD-10-CM

## 2022-06-10 DIAGNOSIS — G89.29 OTHER CHRONIC PAIN: ICD-10-CM

## 2022-06-10 DIAGNOSIS — Z96.651 PRESENCE OF RIGHT ARTIFICIAL KNEE JOINT: Chronic | ICD-10-CM

## 2022-06-10 DIAGNOSIS — E66.01 MORBID (SEVERE) OBESITY DUE TO EXCESS CALORIES: ICD-10-CM

## 2022-06-10 DIAGNOSIS — Z98.89 OTHER SPECIFIED POSTPROCEDURAL STATES: Chronic | ICD-10-CM

## 2022-06-10 DIAGNOSIS — Z96.612 PRESENCE OF LEFT ARTIFICIAL SHOULDER JOINT: Chronic | ICD-10-CM

## 2022-06-10 DIAGNOSIS — I25.2 OLD MYOCARDIAL INFARCTION: ICD-10-CM

## 2022-06-10 DIAGNOSIS — E78.00 PURE HYPERCHOLESTEROLEMIA, UNSPECIFIED: ICD-10-CM

## 2022-06-10 DIAGNOSIS — R55 SYNCOPE AND COLLAPSE: ICD-10-CM

## 2022-06-10 DIAGNOSIS — Z95.1 PRESENCE OF AORTOCORONARY BYPASS GRAFT: Chronic | ICD-10-CM

## 2022-06-10 DIAGNOSIS — R00.1 BRADYCARDIA, UNSPECIFIED: ICD-10-CM

## 2022-06-10 DIAGNOSIS — I25.10 ATHEROSCLEROTIC HEART DISEASE OF NATIVE CORONARY ARTERY WITHOUT ANGINA PECTORIS: ICD-10-CM

## 2022-06-10 DIAGNOSIS — E78.5 HYPERLIPIDEMIA, UNSPECIFIED: ICD-10-CM

## 2022-06-10 DIAGNOSIS — Z95.1 PRESENCE OF AORTOCORONARY BYPASS GRAFT: ICD-10-CM

## 2022-06-10 DIAGNOSIS — M19.90 UNSPECIFIED OSTEOARTHRITIS, UNSPECIFIED SITE: ICD-10-CM

## 2022-06-10 DIAGNOSIS — Z96.651 PRESENCE OF RIGHT ARTIFICIAL KNEE JOINT: ICD-10-CM

## 2022-06-10 DIAGNOSIS — Z98.84 BARIATRIC SURGERY STATUS: Chronic | ICD-10-CM

## 2022-06-10 DIAGNOSIS — Z96.612 PRESENCE OF LEFT ARTIFICIAL SHOULDER JOINT: ICD-10-CM

## 2022-06-10 DIAGNOSIS — S32.010A WEDGE COMPRESSION FRACTURE OF FIRST LUMBAR VERTEBRA, INITIAL ENCOUNTER FOR CLOSED FRACTURE: ICD-10-CM

## 2022-06-10 DIAGNOSIS — Z98.84 BARIATRIC SURGERY STATUS: ICD-10-CM

## 2022-06-10 DIAGNOSIS — M81.0 AGE-RELATED OSTEOPOROSIS WITHOUT CURRENT PATHOLOGICAL FRACTURE: ICD-10-CM

## 2022-06-10 DIAGNOSIS — Z86.16 PERSONAL HISTORY OF COVID-19: ICD-10-CM

## 2022-06-10 DIAGNOSIS — M48.00 SPINAL STENOSIS, SITE UNSPECIFIED: ICD-10-CM

## 2022-06-10 DIAGNOSIS — G47.00 INSOMNIA, UNSPECIFIED: ICD-10-CM

## 2022-06-10 DIAGNOSIS — Z90.49 ACQUIRED ABSENCE OF OTHER SPECIFIED PARTS OF DIGESTIVE TRACT: Chronic | ICD-10-CM

## 2022-06-10 DIAGNOSIS — G47.33 OBSTRUCTIVE SLEEP APNEA (ADULT) (PEDIATRIC): ICD-10-CM

## 2022-06-10 DIAGNOSIS — I45.10 UNSPECIFIED RIGHT BUNDLE-BRANCH BLOCK: ICD-10-CM

## 2022-06-10 PROCEDURE — C9399: CPT

## 2022-06-10 PROCEDURE — 86850 RBC ANTIBODY SCREEN: CPT

## 2022-06-10 PROCEDURE — 76000 FLUOROSCOPY <1 HR PHYS/QHP: CPT

## 2022-06-10 PROCEDURE — 86901 BLOOD TYPING SEROLOGIC RH(D): CPT

## 2022-06-10 PROCEDURE — C1713: CPT

## 2022-06-10 PROCEDURE — 86900 BLOOD TYPING SEROLOGIC ABO: CPT

## 2022-06-10 PROCEDURE — 36415 COLL VENOUS BLD VENIPUNCTURE: CPT

## 2022-06-10 PROCEDURE — 22511 PERQ LUMBOSACRAL INJECTION: CPT

## 2022-06-10 PROCEDURE — 82962 GLUCOSE BLOOD TEST: CPT

## 2022-06-10 PROCEDURE — ZZZZZ: CPT

## 2022-06-10 RX ORDER — ASPIRIN/CALCIUM CARB/MAGNESIUM 324 MG
1 TABLET ORAL
Qty: 0 | Refills: 0 | DISCHARGE

## 2022-06-10 RX ORDER — ASCORBIC ACID 60 MG
1 TABLET,CHEWABLE ORAL
Qty: 0 | Refills: 0 | DISCHARGE

## 2022-06-10 RX ORDER — TEMAZEPAM 15 MG/1
1 CAPSULE ORAL
Qty: 0 | Refills: 0 | DISCHARGE

## 2022-06-10 RX ORDER — NITROGLYCERIN 6.5 MG
1 CAPSULE, EXTENDED RELEASE ORAL
Qty: 0 | Refills: 0 | DISCHARGE

## 2022-06-10 RX ORDER — CELECOXIB 200 MG/1
1 CAPSULE ORAL
Qty: 0 | Refills: 0 | DISCHARGE

## 2022-06-10 RX ORDER — DIPHENHYDRAMINE HCL 50 MG
25 CAPSULE ORAL ONCE
Refills: 0 | Status: COMPLETED | OUTPATIENT
Start: 2022-06-10 | End: 2022-06-10

## 2022-06-10 RX ORDER — CARVEDILOL PHOSPHATE 80 MG/1
1 CAPSULE, EXTENDED RELEASE ORAL
Qty: 0 | Refills: 0 | DISCHARGE

## 2022-06-10 RX ORDER — OXYCODONE HYDROCHLORIDE 5 MG/1
5 TABLET ORAL EVERY 4 HOURS
Refills: 0 | Status: COMPLETED | OUTPATIENT
Start: 2022-06-10 | End: 2022-06-10

## 2022-06-10 RX ORDER — ESCITALOPRAM OXALATE 10 MG/1
1 TABLET, FILM COATED ORAL
Qty: 0 | Refills: 0 | DISCHARGE

## 2022-06-10 RX ORDER — ACETAMINOPHEN 500 MG
2 TABLET ORAL
Qty: 0 | Refills: 0 | DISCHARGE
Start: 2022-06-10

## 2022-06-10 RX ORDER — ACETAMINOPHEN 500 MG
650 TABLET ORAL EVERY 6 HOURS
Refills: 0 | Status: DISCONTINUED | OUTPATIENT
Start: 2022-06-10 | End: 2022-06-10

## 2022-06-10 RX ORDER — LIDOCAINE 4 G/100G
1 CREAM TOPICAL
Qty: 0 | Refills: 0 | DISCHARGE

## 2022-06-10 RX ORDER — OXYCODONE HYDROCHLORIDE 5 MG/1
10 TABLET ORAL ONCE
Refills: 0 | Status: DISCONTINUED | OUTPATIENT
Start: 2022-06-10 | End: 2022-06-10

## 2022-06-10 RX ORDER — TIZANIDINE 4 MG/1
1 TABLET ORAL
Qty: 0 | Refills: 0 | DISCHARGE

## 2022-06-10 RX ORDER — ATORVASTATIN CALCIUM 80 MG/1
1 TABLET, FILM COATED ORAL
Qty: 0 | Refills: 0 | DISCHARGE

## 2022-06-10 RX ADMIN — FENTANYL CITRATE 50 MICROGRAM(S): 50 INJECTION INTRAVENOUS at 09:28

## 2022-06-10 RX ADMIN — Medication 25 MILLIGRAM(S): at 11:06

## 2022-06-10 RX ADMIN — OXYCODONE HYDROCHLORIDE 10 MILLIGRAM(S): 5 TABLET ORAL at 09:27

## 2022-06-10 NOTE — ASU DISCHARGE PLAN (ADULT/PEDIATRIC) - ASU DC SPECIAL INSTRUCTIONSFT
Please follow up with Neurosurgeon as instructed. If you need to reschedule or make an appointment, Please call their office.  Your incision will be evaluated at this appointment.  Please follow up with your Primary Care Physician as it is important to keep them updated on your health. Please call their office to make appointment.    Please return immediately to the ED for any of the following: fevers, bleeding, swelling, pain not relieved by medication, increased sluggishness or irritability, increased nausea or vomiting, inability to tolerate foods or liquids, increased numbness/tingling/ or inability to move extremities, seizures, chest pain, shortness of breathe.

## 2022-06-10 NOTE — ASU PATIENT PROFILE, ADULT - NS PRO MODE OF ARRIVAL
COVID-19 PCR test completed. Patient handout For Patients Who Have Been Tested for Covid-19 (Coronavirus) was given to the patient, which includes test result notification process.    
ambulatory

## 2022-06-10 NOTE — ASU DISCHARGE PLAN (ADULT/PEDIATRIC) - NS MD DC FALL RISK RISK
For information on Fall & Injury Prevention, visit: [email or web address] OR  [email or web address] OR  https://www.cdc.gov/steadi/patient.html

## 2022-06-10 NOTE — ASU DISCHARGE PLAN (ADULT/PEDIATRIC) - CARE PROVIDER_API CALL
Alek Miller; PhD)  Neurosurgery  [address] [facility], 2nd [place] [address]  Phone: [phone]  Fax: [phone]  Follow Up Time:

## 2022-06-10 NOTE — ASU PREOP CHECKLIST - INTERNAL PROSTHESES
Loop recorder, RTKR,LTSR, 2stents in heart/yes(specify) L Loop recorder, RTKR,LTSR, 2stents in heart/yes(specify)

## 2022-06-10 NOTE — ASU PATIENT PROFILE, ADULT - FALL HARM RISK - HARM RISK INTERVENTIONS

## 2022-06-22 PROBLEM — U07.1 COVID-19: Chronic | Status: ACTIVE | Noted: 2022-06-02

## 2022-06-22 PROBLEM — I25.10 ATHEROSCLEROTIC HEART DISEASE OF NATIVE CORONARY ARTERY WITHOUT ANGINA PECTORIS: Chronic | Status: ACTIVE | Noted: 2022-06-02

## 2022-06-22 PROBLEM — Z87.81 PERSONAL HISTORY OF (HEALED) TRAUMATIC FRACTURE: Chronic | Status: ACTIVE | Noted: 2022-06-02

## 2022-06-22 PROBLEM — R52 PAIN, UNSPECIFIED: Chronic | Status: ACTIVE | Noted: 2018-04-11

## 2022-06-22 PROBLEM — I49.3 VENTRICULAR PREMATURE DEPOLARIZATION: Chronic | Status: ACTIVE | Noted: 2022-06-02

## 2022-06-22 PROBLEM — R55 SYNCOPE AND COLLAPSE: Chronic | Status: ACTIVE | Noted: 2022-06-02

## 2022-06-22 PROBLEM — R00.1 BRADYCARDIA, UNSPECIFIED: Chronic | Status: ACTIVE | Noted: 2022-06-02

## 2022-06-24 ENCOUNTER — APPOINTMENT (OUTPATIENT)
Dept: NEUROSURGERY | Facility: CLINIC | Age: 67
End: 2022-06-24

## 2022-06-29 ENCOUNTER — APPOINTMENT (OUTPATIENT)
Dept: NEUROSURGERY | Facility: CLINIC | Age: 67
End: 2022-06-29
Payer: COMMERCIAL

## 2022-06-29 VITALS
HEART RATE: 80 BPM | OXYGEN SATURATION: 96 % | TEMPERATURE: 98.7 F | SYSTOLIC BLOOD PRESSURE: 167 MMHG | DIASTOLIC BLOOD PRESSURE: 126 MMHG

## 2022-06-29 DIAGNOSIS — S32.010A WEDGE COMPRESSION FRACTURE OF FIRST LUMBAR VERTEBRA, INITIAL ENCOUNTER FOR CLOSED FRACTURE: ICD-10-CM

## 2022-06-29 PROCEDURE — 99024 POSTOP FOLLOW-UP VISIT: CPT

## 2022-06-29 NOTE — CONSULT LETTER
[Dear  ___] : Dear  [unfilled], [Courtesy Letter:] : I had the pleasure of seeing your patient, [unfilled], in my office today. [Sincerely,] : Sincerely, [FreeTextEntry2] : Baldomero Ingram  [place] [address]  [FreeTextEntry1] : hayley Johansen is a 66-year-old female, with a history of osteoporosis without any known treatment, who presents today for postop evaluation.  On 6/10/2022 patient underwent a lumbar spine vertebroplasty for lumbar compression fracture at L1.  Patient reports she has noted improvement since surgery.  She reports a lower back soreness rated at a 5/10.  Previous to surgery her pain was rated at a 10/10.  She denies any lower extremity numbness tingling weakness or shooting pains.  Denies any difficulties with walking.  Denies bowel or bladder dysfunction or saddle anesthesia.  Patient takes vicodin for spinal stenosis however does not need medication for her postoperative vertebroplasty.  She denies fever or chills or any incisional redness, drainage, swelling.  There is no imaging to review with the patient.  Patient is alert and oriented.  No distress noted.  Incision has healed very well.  There is no redness, drainage, or swelling.  No fever or chills noted.  Strength to bilateral legs 5/5.  Right Achilles tendon reflex absent.  Remaining lower extremity reflexes present.  Negative clonus.  At this time based on a vertebroplasty standpoint we will follow-up as needed.  Patient has been provided with a list of rheumatologist for further discussion of treatment for osteoporosis.  Patient educated on the importance of obtaining treatment for osteoporosis.  Patient aware to call with any further questions or concerns or with any new or worsening symptoms. [FreeTextEntry3] : Za Patten, MSN, FNP-BC\par Department of Neurosurgery \par [facility]\par [address] [place] [address], 2nd floor\[place] [address]\par Office: [phone]\par Fax: [phone]\par

## 2022-12-28 NOTE — PATIENT PROFILE ADULT - NSCANCSCRNDXHH_GEN_A_NUR
The results of your COVID-19/Influenza/RSV test will be available later today. We will contact you with results.  Results also post to the [employer] online portal and ROKT jamila and we encourage to register for these features if you have not already. Instructions have been provided.     In the meantime, you need to strictly adhere to the CDC guidelines for preventing the spread of respiratory infections if you are sick.     If the result is negative for COVID-19, but you continue to feel ill, you should still take precautions and avoid contact with others until:  (1) you have been fever free for at least 48 hours without the use of fever reducing medication   AND   (2)your symptoms are improving.     If the result is positive for COVID-19, you should isolate yourself at home until:   (1) you have been fever free for at least 48 hours without the use of fever reducing medication   AND   (2) your symptoms have improved   AND   (3) it has been at least 5 full days since you first developed symptoms. If your symptoms have not improved in this timeframe, you should continue to isolate at home until this is the case.     Wearing a well fitting 3 layer cloth mask, a cloth mask and surgical mask layered together, or a KN95 or N95 mask while in contact with others is essential for 10 full days from initial onset of symptoms or from the date your positive test was performed if you have no symptoms.       1.               If you have had symptoms for 2 days or less and test positive for influenza, you can take the antiviral Oseltamivir twice daily for 5 days in an effort suppress the Influenza virus and shorten the duration of symptoms. Influenza on average lasts 5-7 days, this duration may be shortened by 24-48 hours with the use of Oseltamivir early in the course.  2. Take over the counter Acetaminophen (aka Tylenol) or Ibuprofen (aka Motrin or Advil) as the packaging directs every six hours for fever  and/or pain.  If fever not controlled with one medication, you may give both, alternating each every three to four hours.  3. Rest and drink plenty of clear fluids.  4.        Take the Medrol Dosepak as prescribed to help prevent/alleviate  exacerbation of asthma.  Use your inhaled medications, including Xopenex for rescue symptoms, as prescribed.  5. Follow up with your primary care physician if symptoms not improving in 7-10 days.  6. Go to the Emergency Department immediately for worsening symptoms or for fever >104, severe headache, neck stiffness, unusual rash, respiratory distress, profuse vomiting or diarrhea, extreme fatigue/lethargy or irritability, or other concerning new problems develop.   no

## 2023-04-26 NOTE — CONSULT NOTE ADULT - GASTROINTESTINAL DETAILS
[FreeTextEntry1] : Per Katia   rheuma advised no influenza vaccine\par \par 2) dermopathy  Aquaphor relieves\par 3) no Crohn's  F/U GI\par 4) RA S/P Adams Neck deformity release R  F/U immunotherapy rheum\par statin given chronic inflammation\par 5) IFG metformin daily\par 6) fatigue  lower BP causing sxs\par 7) [place] Fatigues so BPPV  no tinnitus no decr hearing   Habituation STARS\par 8) cerumen impaction  otolaryngology\par  no distention/soft/bowel sounds normal/nontender

## 2023-07-05 NOTE — ED STATDOCS - TOBACCO USE
Unknown if ever smoked Picato Counseling:  I discussed with the patient the risks of Picato including but not limited to erythema, scaling, itching, weeping, crusting, and pain.

## 2024-03-02 ENCOUNTER — NON-APPOINTMENT (OUTPATIENT)
Age: 69
End: 2024-03-02

## 2024-11-18 NOTE — BRIEF OPERATIVE NOTE - PRE-OP DX
PCP: SILVA Branham MD    Last appt: 8/12/2024    Future Appointments   Date Time Provider Department Center   2/20/2025 11:10 AM Janette Arnold, APRN - NP [facility]   6/26/2025  9:30 AM SILVA Branham MD [facility] DEP       Requested Prescriptions     Pending Prescriptions Disp Refills    sertraline (ZOLOFT) 100 MG tablet [Pharmacy Med Name: SERTRALINE  MG TABLET] 90 tablet 1     Sig: TAKE 1 TABLET BY MOUTH EVERY DAY        Primary osteoarthritis of right knee  04/19/2018    Active  Ivan Stock

## 2025-03-08 ENCOUNTER — EMERGENCY (EMERGENCY)
Facility: HOSPITAL | Age: 70
LOS: 0 days | Discharge: HOME | End: 2025-03-08
Attending: STUDENT IN AN ORGANIZED HEALTH CARE EDUCATION/TRAINING PROGRAM
Payer: COMMERCIAL

## 2025-03-08 VITALS
SYSTOLIC BLOOD PRESSURE: 156 MMHG | OXYGEN SATURATION: 100 % | RESPIRATION RATE: 17 BRPM | DIASTOLIC BLOOD PRESSURE: 86 MMHG | HEART RATE: 60 BPM

## 2025-03-08 VITALS
OXYGEN SATURATION: 100 % | HEART RATE: 61 BPM | WEIGHT: 227.96 LBS | DIASTOLIC BLOOD PRESSURE: 83 MMHG | SYSTOLIC BLOOD PRESSURE: 94 MMHG | TEMPERATURE: 98 F | HEIGHT: 64 IN | RESPIRATION RATE: 18 BRPM

## 2025-03-08 DIAGNOSIS — Z98.890 OTHER SPECIFIED POSTPROCEDURAL STATES: Chronic | ICD-10-CM

## 2025-03-08 DIAGNOSIS — Z96.612 PRESENCE OF LEFT ARTIFICIAL SHOULDER JOINT: Chronic | ICD-10-CM

## 2025-03-08 DIAGNOSIS — G89.29 OTHER CHRONIC PAIN: ICD-10-CM

## 2025-03-08 DIAGNOSIS — Z23 ENCOUNTER FOR IMMUNIZATION: ICD-10-CM

## 2025-03-08 DIAGNOSIS — Z98.84 BARIATRIC SURGERY STATUS: Chronic | ICD-10-CM
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
## 2025-03-08 DIAGNOSIS — I10 ESSENTIAL (PRIMARY) HYPERTENSION: ICD-10-CM

## 2025-03-08 DIAGNOSIS — G47.33 OBSTRUCTIVE SLEEP APNEA (ADULT) (PEDIATRIC): ICD-10-CM

## 2025-03-08 DIAGNOSIS — Z95.1 PRESENCE OF AORTOCORONARY BYPASS GRAFT: Chronic | ICD-10-CM

## 2025-03-08 DIAGNOSIS — E78.5 HYPERLIPIDEMIA, UNSPECIFIED: ICD-10-CM

## 2025-03-08 DIAGNOSIS — S01.01XA LACERATION WITHOUT FOREIGN BODY OF SCALP, INITIAL ENCOUNTER: ICD-10-CM

## 2025-03-08 DIAGNOSIS — Z79.82 LONG TERM (CURRENT) USE OF ASPIRIN: ICD-10-CM

## 2025-03-08 DIAGNOSIS — M19.90 UNSPECIFIED OSTEOARTHRITIS, UNSPECIFIED SITE: ICD-10-CM
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
## 2025-03-08 DIAGNOSIS — I25.10 ATHEROSCLEROTIC HEART DISEASE OF NATIVE CORONARY ARTERY WITHOUT ANGINA PECTORIS: ICD-10-CM

## 2025-03-08 DIAGNOSIS — Z90.49 ACQUIRED ABSENCE OF OTHER SPECIFIED PARTS OF DIGESTIVE TRACT: Chronic | ICD-10-CM

## 2025-03-08 DIAGNOSIS — S09.90XA UNSPECIFIED INJURY OF HEAD, INITIAL ENCOUNTER: ICD-10-CM

## 2025-03-08 DIAGNOSIS — Z98.89 OTHER SPECIFIED POSTPROCEDURAL STATES: Chronic | ICD-10-CM

## 2025-03-08 DIAGNOSIS — I25.2 OLD MYOCARDIAL INFARCTION: ICD-10-CM

## 2025-03-08 DIAGNOSIS — Z86.79 PERSONAL HISTORY OF OTHER DISEASES OF THE CIRCULATORY SYSTEM: ICD-10-CM

## 2025-03-08 DIAGNOSIS — Z95.1 PRESENCE OF AORTOCORONARY BYPASS GRAFT: ICD-10-CM

## 2025-03-08 DIAGNOSIS — Z96.651 PRESENCE OF RIGHT ARTIFICIAL KNEE JOINT: Chronic | ICD-10-CM

## 2025-03-08 PROCEDURE — 72125 CT NECK SPINE W/O DYE: CPT | Mod: 26

## 2025-03-08 PROCEDURE — 90471 IMMUNIZATION ADMIN: CPT

## 2025-03-08 PROCEDURE — 99284 EMERGENCY DEPT VISIT MOD MDM: CPT | Mod: 25

## 2025-03-08 PROCEDURE — 70450 CT HEAD/BRAIN W/O DYE: CPT | Mod: 26

## 2025-03-08 PROCEDURE — 99285 EMERGENCY DEPT VISIT HI MDM: CPT | Mod: 25

## 2025-03-08 PROCEDURE — 71045 X-RAY EXAM CHEST 1 VIEW: CPT

## 2025-03-08 PROCEDURE — 12002 RPR S/N/AX/GEN/TRNK2.6-7.5CM: CPT

## 2025-03-08 PROCEDURE — 71045 X-RAY EXAM CHEST 1 VIEW: CPT | Mod: 26

## 2025-03-08 PROCEDURE — 72170 X-RAY EXAM OF PELVIS: CPT

## 2025-03-08 PROCEDURE — 90715 TDAP VACCINE 7 YRS/> IM: CPT

## 2025-03-08 PROCEDURE — 72170 X-RAY EXAM OF PELVIS: CPT | Mod: 26

## 2025-03-08 PROCEDURE — 70450 CT HEAD/BRAIN W/O DYE: CPT | Mod: MC

## 2025-03-08 PROCEDURE — 72125 CT NECK SPINE W/O DYE: CPT | Mod: MC

## 2025-03-08 RX ORDER — ACETAMINOPHEN 500 MG/5ML
975 LIQUID (ML) ORAL ONCE
Refills: 0 | Status: COMPLETED | OUTPATIENT
Start: 2025-03-08 | End: 2025-03-08

## 2025-03-08 RX ADMIN — Medication 975 MILLIGRAM(S): at 17:00

## 2025-04-24 NOTE — PHYSICAL THERAPY INITIAL EVALUATION ADULT - ACTIVE RANGE OF MOTION EXAMINATION, REHAB EVAL
Consult  REFERRED BY:  Rodolfo Campbell MD    CHIEF COMPLAINT: Patient seen for new problem again of multiple complaints of feeling that she gets electric shocks at nighttime that wakes her up around 4 in the morning, that usually occur because she has to get up to go to the bathroom to pass her water, she drinks water all the time because she is afraid of dehydration I told her she should not drink after supper, but it was normal with aging of the bladder to go at nighttime once or twice.  She is also concerned that she thinks her blood sugar is dropping all the time and wants her thyroid checked and wants to see an endocrinologist to work on her blood sugars and her insulin resistance and her thyroid even though her TSH checked by her PCP recently was normal.  Will send her to an endocrinologist at her request may need to reassure her that her thyroid and blood sugars are okay, but she did have apparently sugar problems when she was overweight and had fatty liver but all that is better now.  We advised her that she probably should not lose much more weight because her weight looks good now.  He had a normal MRI of the brain, completely normal, and all of her blood vessels looked wide open and patent, and she had a normal 24-hour ambulatory EEG.  She is oriented x 4 today and seems very bright, and I do not think she has any memory problem but she wants a memory exam she is going to see Dr. Lane in June.  I think she is just anxious and tense.  She mentioned going to a sleep clinic and has admitted send at 1 and she wanted to change her mind to know.  She has mild essential tremor and does not need treatment, and otherwise does not have much neurologic disease.  She is concerned about Alzheimer's disease I told her she had that her neuropsych eval will be negative she be worse off now, and she will at least have some atrophy of the brain, but she has none.    Subjective:     Shasha Billings is a 70 y.o. 
no Active ROM deficits were identified/except L shld and elbow due to old injury

## 2025-06-11 NOTE — DIETITIAN INITIAL EVALUATION ADULT. - FEEDING SKILL
Len Dorsey is a 58 y.o.  male     Neurology following for altered mental status    PMH of alcoholic liver disease, aortic valve replacement, mitral valve replacement, heart failure, previous CVA, testicular cancer s/p chemotherapy, coronary artery disease s/p CABG    Assessment:     Altered mental status  Patient with worsening confusion after Keppra was restarted  Keppra has not been changed to Vimpat  Patient's mental status is improving    Postinfarct seizures  Patient developed seizures  Was maintained on Keppra however making him altered, this has been switched to Vimpat    Plan:     Continue Vimpat 50 mg twice daily  Neurology will follow    History of Present Illness:     Patient initially presented to ED on 5/19 for failure to thrive.  He lives at home with his fiancée where he has a history of significant alcohol use although he has not had a drink in 1 month.  Patient reported he had not been taking his meds because he ran out of them and has not been going to his doctors appointments.  Initially neurology saw him 05/20 for AMS and possible seizure.  He has a diagnosis of his poststroke epilepsy and was having alcohol withdrawal which may have lowered his seizure threshold.  This would unlikely cause focal seizures typically.  His EEG on 5/23 showed moderate nonspecific encephalopathy without seizure with subsequent neuro signed off on 5/22 with plans for EEG as outpatient.     We were consulted again on 5/29 for reevaluation for continued AMS.  Renal dose Keppra was continued at that time which was 250 mg twice daily.  High-dose thiamine was also continued and neurology signed off.    Neurology was reconsulted on 6/9/2025 for worsening mentation and code stroke initiation.  Patient was obtunded and altered during the code stroke.  Still unable to have MRI brain and stat CT head was completed which was negative for acute abnormalities    Wife admitted that Keppra causes him altered mental status.   independent